# Patient Record
Sex: FEMALE | Race: WHITE | NOT HISPANIC OR LATINO | Employment: OTHER | URBAN - METROPOLITAN AREA
[De-identification: names, ages, dates, MRNs, and addresses within clinical notes are randomized per-mention and may not be internally consistent; named-entity substitution may affect disease eponyms.]

---

## 2023-01-30 PROBLEM — I47.19 PAT (PAROXYSMAL ATRIAL TACHYCARDIA) (CMS-HCC): Status: ACTIVE | Noted: 2023-01-30

## 2023-01-30 PROBLEM — I87.2 CHRONIC VENOUS STASIS DERMATITIS: Status: ACTIVE | Noted: 2023-01-30

## 2023-01-30 PROBLEM — H53.8 BLURRING OF VISUAL IMAGE: Status: ACTIVE | Noted: 2023-01-30

## 2023-01-30 PROBLEM — M81.0 OSTEOPOROSIS: Status: ACTIVE | Noted: 2023-01-30

## 2023-01-30 PROBLEM — R22.31: Status: ACTIVE | Noted: 2023-01-30

## 2023-01-30 PROBLEM — E87.8: Status: ACTIVE | Noted: 2023-01-30

## 2023-01-30 PROBLEM — K21.9 ESOPHAGEAL REFLUX: Status: ACTIVE | Noted: 2023-01-30

## 2023-01-30 PROBLEM — M65.949 SYNOVITIS OF HAND: Status: ACTIVE | Noted: 2023-01-30

## 2023-01-30 PROBLEM — R79.89 ABNORMAL TSH: Status: ACTIVE | Noted: 2023-01-30

## 2023-01-30 PROBLEM — I10 REACTIVE HYPERTENSION: Status: ACTIVE | Noted: 2023-01-30

## 2023-01-30 PROBLEM — R60.9 SOFT TISSUE SWELLING OF BACK: Status: ACTIVE | Noted: 2023-01-30

## 2023-01-30 PROBLEM — H35.81 RETINAL EDEMA: Status: ACTIVE | Noted: 2023-01-30

## 2023-01-30 PROBLEM — R93.89 ABNORMAL CT SCAN, CHEST: Status: ACTIVE | Noted: 2023-01-30

## 2023-01-30 PROBLEM — R39.15 URINARY URGENCY: Status: ACTIVE | Noted: 2023-01-30

## 2023-01-30 PROBLEM — H26.9 CATARACT: Status: ACTIVE | Noted: 2023-01-30

## 2023-01-30 PROBLEM — U09.9 POST-COVID-19 CONDITION: Status: ACTIVE | Noted: 2023-01-30

## 2023-01-30 PROBLEM — H91.90 HEARING LOSS: Status: ACTIVE | Noted: 2023-01-30

## 2023-01-30 PROBLEM — R35.1 NOCTURIA: Status: ACTIVE | Noted: 2023-01-30

## 2023-01-30 PROBLEM — R22.42 LOWER LEG MASS, LEFT: Status: ACTIVE | Noted: 2023-01-30

## 2023-01-30 PROBLEM — E55.9 VITAMIN D DEFICIENCY: Status: ACTIVE | Noted: 2023-01-30

## 2023-01-30 PROBLEM — R30.0 DYSURIA: Status: ACTIVE | Noted: 2023-01-30

## 2023-01-30 PROBLEM — M25.532 CHRONIC PAIN OF LEFT WRIST: Status: ACTIVE | Noted: 2023-01-30

## 2023-01-30 PROBLEM — B37.0 ORAL CANDIDIASIS: Status: ACTIVE | Noted: 2023-01-30

## 2023-01-30 PROBLEM — E87.5 SERUM POTASSIUM ELEVATED: Status: ACTIVE | Noted: 2023-01-30

## 2023-01-30 PROBLEM — R00.2 PALPITATIONS: Status: ACTIVE | Noted: 2023-01-30

## 2023-01-30 PROBLEM — N81.10 PELVIC ORGAN PROLAPSE QUANTIFICATION STAGE 2 CYSTOCELE: Status: ACTIVE | Noted: 2023-01-30

## 2023-01-30 PROBLEM — N81.4 UTERINE PROLAPSE: Status: ACTIVE | Noted: 2023-01-30

## 2023-01-30 PROBLEM — L30.9 ECZEMA: Status: ACTIVE | Noted: 2023-01-30

## 2023-01-30 PROBLEM — R68.2 DRY MOUTH: Status: ACTIVE | Noted: 2023-01-30

## 2023-01-30 PROBLEM — L84 CALLUS OF FOOT: Status: ACTIVE | Noted: 2023-01-30

## 2023-01-30 PROBLEM — M79.9 LESION OF SOFT TISSUE: Status: ACTIVE | Noted: 2023-01-30

## 2023-01-30 PROBLEM — H43.813 POSTERIOR VITREOUS DETACHMENT, BOTH EYES: Status: ACTIVE | Noted: 2023-01-30

## 2023-01-30 PROBLEM — H04.129 DRY EYE SYNDROME: Status: ACTIVE | Noted: 2023-01-30

## 2023-01-30 PROBLEM — D12.6 SERRATED ADENOMA OF COLON: Status: ACTIVE | Noted: 2023-01-30

## 2023-01-30 PROBLEM — G89.29 CHRONIC PAIN OF LEFT WRIST: Status: ACTIVE | Noted: 2023-01-30

## 2023-01-30 PROBLEM — U07.1 COVID-19 VIRUS INFECTION: Status: ACTIVE | Noted: 2023-01-30

## 2023-01-30 PROBLEM — N84.1 CERVICAL POLYP: Status: ACTIVE | Noted: 2023-01-30

## 2023-01-30 PROBLEM — H33.20 RETINAL DETACHMENT: Status: ACTIVE | Noted: 2023-01-30

## 2023-01-30 PROBLEM — E03.9 HYPOTHYROIDISM: Status: ACTIVE | Noted: 2023-01-30

## 2023-01-30 PROBLEM — S30.814A ABRASION OF VAGINA: Status: ACTIVE | Noted: 2023-01-30

## 2023-01-30 PROBLEM — J84.10 GRANULOMATOUS LUNG DISEASE (MULTI): Status: ACTIVE | Noted: 2023-01-30

## 2023-01-30 PROBLEM — M65.9 SYNOVITIS OF HAND: Status: ACTIVE | Noted: 2023-01-30

## 2023-01-30 PROBLEM — R09.89 DECREASED PEDAL PULSES: Status: ACTIVE | Noted: 2023-01-30

## 2023-01-30 PROBLEM — N36.2 URETHRAL POLYP: Status: ACTIVE | Noted: 2023-01-30

## 2023-01-30 PROBLEM — R82.90 ABNORMAL URINALYSIS: Status: ACTIVE | Noted: 2023-01-30

## 2023-01-30 PROBLEM — E28.39 HYPOESTROGENISM: Status: ACTIVE | Noted: 2023-01-30

## 2023-01-30 PROBLEM — M54.50 LUMBAR PAIN: Status: ACTIVE | Noted: 2023-01-30

## 2023-01-30 PROBLEM — R91.1 INCIDENTAL LUNG NODULE, GREATER THAN OR EQUAL TO 8MM: Status: ACTIVE | Noted: 2023-01-30

## 2023-01-30 PROBLEM — M85.80 OSTEOPENIA: Status: ACTIVE | Noted: 2023-01-30

## 2023-01-30 PROBLEM — R35.0 INCREASED FREQUENCY OF URINATION: Status: ACTIVE | Noted: 2023-01-30

## 2023-01-30 RX ORDER — ACETAMINOPHEN 500 MG
1 TABLET ORAL DAILY
COMMUNITY

## 2023-01-30 RX ORDER — MULTIVIT-MIN/FA/LYCOPEN/LUTEIN .4-300-25
1 TABLET ORAL DAILY
COMMUNITY

## 2023-01-30 RX ORDER — GLYCERIN, HYPROMELLOSE, POLYETHYLENE GLYCOL 400 2; 2; 10 MG/ML; MG/ML; MG/ML
SOLUTION/ DROPS OPHTHALMIC AS NEEDED
COMMUNITY

## 2023-01-30 RX ORDER — DENOSUMAB 60 MG/ML
60 INJECTION SUBCUTANEOUS
COMMUNITY
Start: 2019-01-09 | End: 2023-07-14 | Stop reason: SDUPTHER

## 2023-01-30 RX ORDER — PANTOPRAZOLE SODIUM 20 MG/1
1 TABLET, DELAYED RELEASE ORAL DAILY
COMMUNITY
Start: 2022-03-23 | End: 2023-06-13 | Stop reason: SDUPTHER

## 2023-01-30 RX ORDER — LEVOTHYROXINE SODIUM 88 UG/1
88 TABLET ORAL
COMMUNITY
Start: 2022-09-21 | End: 2023-04-12 | Stop reason: SDUPTHER

## 2023-01-30 RX ORDER — CYCLOSPORINE 0.5 MG/ML
1 EMULSION OPHTHALMIC DAILY
COMMUNITY
Start: 2021-06-22

## 2023-01-30 RX ORDER — ESTRADIOL 0.1 MG/G
CREAM VAGINAL 2 TIMES WEEKLY
COMMUNITY
Start: 2019-02-18

## 2023-01-30 RX ORDER — METOPROLOL SUCCINATE 50 MG/1
1.5 TABLET, EXTENDED RELEASE ORAL DAILY
COMMUNITY
Start: 2012-10-08 | End: 2023-08-14 | Stop reason: SDUPTHER

## 2023-03-14 ENCOUNTER — OFFICE VISIT (OUTPATIENT)
Dept: PRIMARY CARE | Facility: CLINIC | Age: 78
End: 2023-03-14
Payer: MEDICARE

## 2023-03-14 VITALS
RESPIRATION RATE: 18 BRPM | WEIGHT: 129 LBS | HEART RATE: 70 BPM | HEIGHT: 65 IN | SYSTOLIC BLOOD PRESSURE: 160 MMHG | BODY MASS INDEX: 21.49 KG/M2 | DIASTOLIC BLOOD PRESSURE: 78 MMHG

## 2023-03-14 DIAGNOSIS — L84 CALLUS OF FOOT: ICD-10-CM

## 2023-03-14 DIAGNOSIS — M81.0 AGE-RELATED OSTEOPOROSIS WITHOUT CURRENT PATHOLOGICAL FRACTURE: ICD-10-CM

## 2023-03-14 DIAGNOSIS — Z00.01 ANNUAL VISIT FOR GENERAL ADULT MEDICAL EXAMINATION WITH ABNORMAL FINDINGS: ICD-10-CM

## 2023-03-14 DIAGNOSIS — Z78.9 FULL CODE STATUS: ICD-10-CM

## 2023-03-14 DIAGNOSIS — R73.09 ELEVATED GLUCOSE LEVEL: ICD-10-CM

## 2023-03-14 DIAGNOSIS — N36.2 URETHRAL POLYP: ICD-10-CM

## 2023-03-14 DIAGNOSIS — I10 REACTIVE HYPERTENSION: ICD-10-CM

## 2023-03-14 DIAGNOSIS — E03.9 HYPOTHYROIDISM, UNSPECIFIED TYPE: ICD-10-CM

## 2023-03-14 DIAGNOSIS — Z00.00 ENCOUNTER FOR SUBSEQUENT ANNUAL WELLNESS VISIT (AWV) IN MEDICARE PATIENT: Primary | ICD-10-CM

## 2023-03-14 DIAGNOSIS — R03.0 ELEVATED BLOOD PRESSURE READING: ICD-10-CM

## 2023-03-14 DIAGNOSIS — Z13.89 ENCOUNTER FOR SCREENING FOR OTHER DISORDER: ICD-10-CM

## 2023-03-14 DIAGNOSIS — R35.1 NOCTURIA: ICD-10-CM

## 2023-03-14 DIAGNOSIS — R12 HEARTBURN: ICD-10-CM

## 2023-03-14 PROBLEM — R30.0 DYSURIA: Status: RESOLVED | Noted: 2023-01-30 | Resolved: 2023-03-14

## 2023-03-14 PROBLEM — I83.893 VARICOSE VEINS OF BOTH LEGS WITH EDEMA: Status: ACTIVE | Noted: 2023-03-14

## 2023-03-14 LAB
POC APPEARANCE, URINE: CLEAR
POC BILIRUBIN, URINE: NEGATIVE
POC BLOOD, URINE: NEGATIVE
POC COLOR, URINE: YELLOW
POC GLUCOSE, URINE: NEGATIVE MG/DL
POC HEMOGLOBIN A1C: 5.8 % (ref 4.2–6.5)
POC KETONES, URINE: NEGATIVE MG/DL
POC LEUKOCYTES, URINE: NEGATIVE
POC NITRITE,URINE: NEGATIVE
POC PH, URINE: 6.5 PH
POC PROTEIN, URINE: NEGATIVE MG/DL
POC SPECIFIC GRAVITY, URINE: <=1.005
POC UROBILINOGEN, URINE: 0.2 EU/DL

## 2023-03-14 PROCEDURE — 36416 COLLJ CAPILLARY BLOOD SPEC: CPT | Performed by: INTERNAL MEDICINE

## 2023-03-14 PROCEDURE — G0442 ANNUAL ALCOHOL SCREEN 15 MIN: HCPCS | Performed by: INTERNAL MEDICINE

## 2023-03-14 PROCEDURE — 1160F RVW MEDS BY RX/DR IN RCRD: CPT | Performed by: INTERNAL MEDICINE

## 2023-03-14 PROCEDURE — 1159F MED LIST DOCD IN RCRD: CPT | Performed by: INTERNAL MEDICINE

## 2023-03-14 PROCEDURE — 81003 URINALYSIS AUTO W/O SCOPE: CPT | Performed by: INTERNAL MEDICINE

## 2023-03-14 PROCEDURE — 83036 HEMOGLOBIN GLYCOSYLATED A1C: CPT | Performed by: INTERNAL MEDICINE

## 2023-03-14 PROCEDURE — 99397 PER PM REEVAL EST PAT 65+ YR: CPT | Performed by: INTERNAL MEDICINE

## 2023-03-14 PROCEDURE — G0439 PPPS, SUBSEQ VISIT: HCPCS | Performed by: INTERNAL MEDICINE

## 2023-03-14 PROCEDURE — 1123F ACP DISCUSS/DSCN MKR DOCD: CPT | Performed by: INTERNAL MEDICINE

## 2023-03-14 PROCEDURE — 3078F DIAST BP <80 MM HG: CPT | Performed by: INTERNAL MEDICINE

## 2023-03-14 PROCEDURE — 1036F TOBACCO NON-USER: CPT | Performed by: INTERNAL MEDICINE

## 2023-03-14 PROCEDURE — 1170F FXNL STATUS ASSESSED: CPT | Performed by: INTERNAL MEDICINE

## 2023-03-14 PROCEDURE — 3077F SYST BP >= 140 MM HG: CPT | Performed by: INTERNAL MEDICINE

## 2023-03-14 SDOH — ECONOMIC STABILITY: FOOD INSECURITY: WITHIN THE PAST 12 MONTHS, THE FOOD YOU BOUGHT JUST DIDN'T LAST AND YOU DIDN'T HAVE MONEY TO GET MORE.: NEVER TRUE

## 2023-03-14 SDOH — ECONOMIC STABILITY: INCOME INSECURITY: IN THE LAST 12 MONTHS, WAS THERE A TIME WHEN YOU WERE NOT ABLE TO PAY THE MORTGAGE OR RENT ON TIME?: NO

## 2023-03-14 SDOH — ECONOMIC STABILITY: HOUSING INSECURITY: IN THE LAST 12 MONTHS, HOW MANY PLACES HAVE YOU LIVED?: 1

## 2023-03-14 SDOH — ECONOMIC STABILITY: TRANSPORTATION INSECURITY
IN THE PAST 12 MONTHS, HAS THE LACK OF TRANSPORTATION KEPT YOU FROM MEDICAL APPOINTMENTS OR FROM GETTING MEDICATIONS?: NO

## 2023-03-14 SDOH — ECONOMIC STABILITY: FOOD INSECURITY: WITHIN THE PAST 12 MONTHS, YOU WORRIED THAT YOUR FOOD WOULD RUN OUT BEFORE YOU GOT MONEY TO BUY MORE.: NEVER TRUE

## 2023-03-14 SDOH — ECONOMIC STABILITY: HOUSING INSECURITY
IN THE LAST 12 MONTHS, WAS THERE A TIME WHEN YOU DID NOT HAVE A STEADY PLACE TO SLEEP OR SLEPT IN A SHELTER (INCLUDING NOW)?: NO

## 2023-03-14 SDOH — HEALTH STABILITY: PHYSICAL HEALTH: ON AVERAGE, HOW MANY MINUTES DO YOU ENGAGE IN EXERCISE AT THIS LEVEL?: 10 MIN

## 2023-03-14 SDOH — HEALTH STABILITY: PHYSICAL HEALTH: ON AVERAGE, HOW MANY DAYS PER WEEK DO YOU ENGAGE IN MODERATE TO STRENUOUS EXERCISE (LIKE A BRISK WALK)?: 3 DAYS

## 2023-03-14 SDOH — ECONOMIC STABILITY: TRANSPORTATION INSECURITY
IN THE PAST 12 MONTHS, HAS LACK OF TRANSPORTATION KEPT YOU FROM MEETINGS, WORK, OR FROM GETTING THINGS NEEDED FOR DAILY LIVING?: NO

## 2023-03-14 ASSESSMENT — PATIENT HEALTH QUESTIONNAIRE - PHQ9
SUM OF ALL RESPONSES TO PHQ9 QUESTIONS 1 AND 2: 0
2. FEELING DOWN, DEPRESSED OR HOPELESS: NOT AT ALL
1. LITTLE INTEREST OR PLEASURE IN DOING THINGS: NOT AT ALL
2. FEELING DOWN, DEPRESSED OR HOPELESS: NOT AT ALL
1. LITTLE INTEREST OR PLEASURE IN DOING THINGS: NOT AT ALL
SUM OF ALL RESPONSES TO PHQ9 QUESTIONS 1 & 2: 0

## 2023-03-14 ASSESSMENT — ACTIVITIES OF DAILY LIVING (ADL)
HEARING - RIGHT EAR: HEARING AID
JUDGMENT_ADEQUATE_SAFELY_COMPLETE_DAILY_ACTIVITIES: YES
EATING: INDEPENDENT
GROOMING: INDEPENDENT
BATHING: INDEPENDENT
DRESSING YOURSELF: INDEPENDENT
TOILETING: INDEPENDENT
ADEQUATE_TO_COMPLETE_ADL: YES
PILL BOX USED: YES
USING TELEPHONE: INDEPENDENT
STIL DRIVING: NO
PATIENT'S MEMORY ADEQUATE TO SAFELY COMPLETE DAILY ACTIVITIES?: YES
PREPARING MEALS: INDEPENDENT
USING TRANSPORTATION: NEEDS ASSISTANCE
WALKS IN HOME: INDEPENDENT
DOING HOUSEWORK: INDEPENDENT
HEARING - LEFT EAR: HEARING AID
MANAGING FINANCES: NEEDS ASSISTANCE
GROCERY SHOPPING: INDEPENDENT
NEEDS ASSISTANCE WITH FOOD: INDEPENDENT
FEEDING YOURSELF: INDEPENDENT
TAKING MEDICATION: INDEPENDENT

## 2023-03-14 ASSESSMENT — SOCIAL DETERMINANTS OF HEALTH (SDOH)
WITHIN THE LAST YEAR, HAVE TO BEEN RAPED OR FORCED TO HAVE ANY KIND OF SEXUAL ACTIVITY BY YOUR PARTNER OR EX-PARTNER?: NO
IN A TYPICAL WEEK, HOW MANY TIMES DO YOU TALK ON THE PHONE WITH FAMILY, FRIENDS, OR NEIGHBORS?: MORE THAN THREE TIMES A WEEK
HOW OFTEN DO YOU GET TOGETHER WITH FRIENDS OR RELATIVES?: TWICE A WEEK
DO YOU BELONG TO ANY CLUBS OR ORGANIZATIONS SUCH AS CHURCH GROUPS UNIONS, FRATERNAL OR ATHLETIC GROUPS, OR SCHOOL GROUPS?: NO
HOW OFTEN DO YOU ATTEND CHURCH OR RELIGIOUS SERVICES?: MORE THAN 4 TIMES PER YEAR
HOW HARD IS IT FOR YOU TO PAY FOR THE VERY BASICS LIKE FOOD, HOUSING, MEDICAL CARE, AND HEATING?: NOT HARD AT ALL
HOW OFTEN DO YOU ATTENT MEETINGS OF THE CLUB OR ORGANIZATION YOU BELONG TO?: NEVER
WITHIN THE LAST YEAR, HAVE YOU BEEN KICKED, HIT, SLAPPED, OR OTHERWISE PHYSICALLY HURT BY YOUR PARTNER OR EX-PARTNER?: NO
WITHIN THE LAST YEAR, HAVE YOU BEEN HUMILIATED OR EMOTIONALLY ABUSED IN OTHER WAYS BY YOUR PARTNER OR EX-PARTNER?: NO
WITHIN THE LAST YEAR, HAVE YOU BEEN AFRAID OF YOUR PARTNER OR EX-PARTNER?: NO

## 2023-03-14 ASSESSMENT — ENCOUNTER SYMPTOMS
LOSS OF SENSATION IN FEET: 0
OCCASIONAL FEELINGS OF UNSTEADINESS: 0
DEPRESSION: 0

## 2023-03-14 ASSESSMENT — COLUMBIA-SUICIDE SEVERITY RATING SCALE - C-SSRS
1. IN THE PAST MONTH, HAVE YOU WISHED YOU WERE DEAD OR WISHED YOU COULD GO TO SLEEP AND NOT WAKE UP?: NO
2. HAVE YOU ACTUALLY HAD ANY THOUGHTS OF KILLING YOURSELF?: NO
6. HAVE YOU EVER DONE ANYTHING, STARTED TO DO ANYTHING, OR PREPARED TO DO ANYTHING TO END YOUR LIFE?: NO

## 2023-03-14 ASSESSMENT — LIFESTYLE VARIABLES
AUDIT-C TOTAL SCORE: 1
HOW OFTEN DO YOU HAVE SIX OR MORE DRINKS ON ONE OCCASION: NEVER
SKIP TO QUESTIONS 9-10: 1
HOW OFTEN DO YOU HAVE A DRINK CONTAINING ALCOHOL: MONTHLY OR LESS
HOW MANY STANDARD DRINKS CONTAINING ALCOHOL DO YOU HAVE ON A TYPICAL DAY: 1 OR 2

## 2023-03-14 ASSESSMENT — PAIN SCALES - GENERAL: PAINLEVEL: 0-NO PAIN

## 2023-03-14 NOTE — PATIENT INSTRUCTIONS
Mammogram due in August.  Blood test for prolia around that time. And fasting as will check sugar and cholesterol.  Next Prolia schedule for august.  Follow up here one year for wellness visit.  Bone density due later in the fall.    Reception: please notify Geetha she will need a PA for Prolia in August.    Staying hydrated is important for many bodily functions. Generally consuming 48-64 ounces of water per day is recommended.    Keep appointment  with  Dr Marin about heartburn despite taking 20 mg protonix.(You take the generic). With concern for higher dose putting you at risk for c diff.  Ask about adding pepcid/famotidine?  Defer to Dr Marin on this.    Thyroid dose was ok on January labs, repeat next January at the latest.    Follow up for blood pressure in3 months.  Check bp once weekly at home, bring readings and bp cuff to the next appt.

## 2023-03-14 NOTE — PROGRESS NOTES
Subjective   Patient ID: Mary Ibrahim is a 78 y.o. female who presents for Medicare Annual Wellness Visit Subsequent (Pt here for subsequent annual Medicare Wellness visit. Pt denies any new problems or concerns. Pt sees GYN for Pelvic-has prolapse. ).      Past Medical, Surgical, and Family History reviewed and updated in chart.  MAMM 8/11/22, CT CARDIO 5/25/22, DEXA 10/19/21, CRC SCRN 8/2021       HPI  Here for mcw visit and annual pe (non gyn), and follow up on results and meds.  Reviewed medicare wellness issue.  Full code  Hcpoa ángel    Neg phq2 and alcohol screen.   Vaccines utd       Scribe Transcription:  She states she has never smoked regularly. She reports exercising.    She denies changes in stool, constipation, and diarrhea. She denies stool incontinence. She reports some urinary incontinence which is seen by urogynecology. She last saw them in October 2022.     She reports feeling generally well.  She has an issue with callus on her feet.  Has varicose veins, they are not bothering her anymore than usual  She reports some neuropathy like sensation on the lateral half of her right foot near her toes.     Abnl ct chest, were following it and then she had covid which caused other issues in the lung on the ct. She feels fine.She had bronchial wall thickening, multiple bilateral pulmonary lesions thought to be related to covid. I looked at the CT from November. She is getting another CT soon to f/u on that from pulmonary in [place].           Patient Care Team:  Mouna Esparza MD as PCP - General  Mouna Esparza MD as PCP - [employer] PCP  Mouna Esparza MD as PCP - [employer]age PCP     Review of Systems     Review of systems, written document, scanned in to office visit.  I reviewed 7 page history and review of systems form.  Objective   Vitals:  /78 (BP Location: Left arm, Patient Position: Sitting, BP Cuff Size: Adult)   Pulse 70   Resp 18   Ht  "1.651 m (5' 5\")   Wt 58.5 kg (129 lb)   BMI 21.47 kg/m²     Darlyn bp by charlee davis:  /58 (BP Location: Right arm, Patient Position: Sitting, BP Cuff Size: Adult)   Pulse 84   Resp 18   Ht 1.651 m (5' 5\")   Wt 58.5 kg (129 lb)   BMI 21.47 kg/m²          Physical Exam  General: Patient is known to me, looks well, is in usual state of health, with  no obvious distress.  Head: normocephalic  Eyes: PERRL, EOMI, no scleral icterus or conjunctival abnormality  Ears:Normal canals and TM's  Oropharynx: Mask in place  Neck: No masses, no cervical (A/P/ or Lateral) adenopathy. Thyroid not enlarged and no nodules. Carotid pulses normal and no bruits.  Chest: Normal AP diameter. No supraclavicular adenopathy.  Lungs: Clear to auscultation: no rales , wheezes, rhonchi, rubs, examined bilaterally, ant/post /lateral. RR normal.  Heart: RRR. No MGCR S1 S2 normal.  Abd: BS normal, no bruits. No hepatosplenomegaly. No abdominal mass or tenderness. No distension.  Extremities: No upper extremity edema. No lower leg edema.No ischemia.   Has extensive varicosities bilateral lower legs and feet not new.  Has some chronic deformities of toes.feet. callus right plantar 3rd,refer.  Joints: no synovitis seen. No deformities.  Pulses: normal posterior tibialis pulses, normal dorsalis pedis pulses. normal radial pulses. Normal femoral pulses without bruits.  Neuro: CN 2-12 intact . Alert, oriented, appropriate.  No focal weakness observed. No tremors. Ambulation is normal .  Psych: Mood and affect normal. No cognitive deficits noted during this exam. Alert, oriented, appropriate.  Skin: No rash, petechiae or excessive bruising.  Lymph: no SC axillary or inguinal adenopathy   Breast Exam : Symmetric appearance. No masses, nipple discharge, skin retraction, axillary or supraclavicular adenopathy.        Assessment/Plan   Problem List Items Addressed This Visit          Circulatory    Reactive hypertension--bp was not a lot better on darlyn. " Will have her monitor at home as this is unusual bp for her, she will call if stays up. Follow up 3 months to review.       Genitourinary    Urethral polyp    Relevant Orders    POCT UA Automated manually resulted (Completed)  Continue to follow with urogyn, asymp       Musculoskeletal    Callus of foot-refer podiatry. Will contact her with name of podiatrist that is at [facility] which is closer for her, is not in the provider caty.    Osteoporosis-continue meds, exercise, follow dexa       Endocrine/Metabolic    Hypothyroidism-jan labs euthyroid, same dose, darlyn jan 2024       Other    Nocturia    Relevant Orders    POCT UA Automated manually resulted (Completed)     Other Visit Diagnoses    Heartburn, breakthru despite daily use of low dose ppi, follow up gi  Abnl CT chest, asymp, is likely from post covid, non smoker, has pulm med follow up.   Routine general medical examination at health care facility    -  Primary    Encounter for subsequent annual wellness visit (AWV) in Medicare patient        Elevated blood pressure reading        Annual visit for general adult medical examination with abnormal findings        Elevated glucose level        Relevant Orders    POCT glycosylated hemoglobin (Hb A1C) manually resulted (Completed)

## 2023-03-16 ENCOUNTER — OFFICE (OUTPATIENT)
Dept: URBAN - METROPOLITAN AREA CLINIC 26 | Facility: CLINIC | Age: 78
End: 2023-03-16

## 2023-03-16 VITALS
WEIGHT: 129 LBS | SYSTOLIC BLOOD PRESSURE: 159 MMHG | DIASTOLIC BLOOD PRESSURE: 72 MMHG | HEIGHT: 65 IN | HEART RATE: 70 BPM | TEMPERATURE: 97.7 F

## 2023-03-16 DIAGNOSIS — K21.9 GASTRO-ESOPHAGEAL REFLUX DISEASE WITHOUT ESOPHAGITIS: ICD-10-CM

## 2023-03-16 PROCEDURE — 99213 OFFICE O/P EST LOW 20 MIN: CPT | Performed by: INTERNAL MEDICINE

## 2023-03-16 RX ORDER — ESOMEPRAZOLE MAGNESIUM 20 MG/1
CAPSULE, DELAYED RELEASE ORAL
Qty: 90 | Refills: 1 | Status: ACTIVE

## 2023-03-16 RX ORDER — PANTOPRAZOLE 20 MG/1
TABLET, DELAYED RELEASE ORAL
Status: COMPLETED
End: 2023-03-16

## 2023-03-22 ENCOUNTER — TELEPHONE (OUTPATIENT)
Dept: PRIMARY CARE | Facility: CLINIC | Age: 78
End: 2023-03-22
Payer: MEDICARE

## 2023-03-22 DIAGNOSIS — L84 CALLUS OF FOOT: Primary | ICD-10-CM

## 2023-04-12 DIAGNOSIS — E03.9 HYPOTHYROIDISM, UNSPECIFIED TYPE: ICD-10-CM

## 2023-04-12 RX ORDER — LEVOTHYROXINE SODIUM 88 UG/1
88 TABLET ORAL DAILY
Qty: 90 TABLET | Refills: 1 | Status: SHIPPED | OUTPATIENT
Start: 2023-04-12 | End: 2023-08-14 | Stop reason: SDUPTHER

## 2023-06-09 ASSESSMENT — ENCOUNTER SYMPTOMS
HYPERTENSION: 1
ORTHOPNEA: 0
SHORTNESS OF BREATH: 0
PALPITATIONS: 1
SWEATS: 0
BLURRED VISION: 1
HEADACHES: 0
NECK PAIN: 0
PND: 0

## 2023-06-10 NOTE — PROGRESS NOTES
Subjective   Patient ID: Mary Ibrahim is a 78 y.o. female who presents for Follow-up (Pt here for follow up visit. PT denies any new issues or concerns at this time. ).follow up on blood pressure.  LAST VISIT PLAN 3/14/2023  PATIENT'S DISCUSSION/SUMMARY:  Mammogram due in August.  Blood test for prolia around that time. And fasting as will check sugar and cholesterol.  Next Prolia schedule for august.  Follow up here one year for wellness visit.  Bone density due later in the fall.  Reception: please notify Geetha she will need a PA for Prolia in August.  Staying hydrated is important for many bodily functions. Generally consuming 48-64 ounces of water per day is recommended.  Keep appointment  with  Dr Marin about heartburn despite taking 20 mg protonix.(You take the generic). With concern for higher dose putting you at risk for c diff.  Ask about adding pepcid/famotidine?  Defer to Dr Marin on this.  Thyroid dose was ok on January labs, repeat next January at the latest.  Follow up for blood pressure in3 months.  Check bp once weekly at home, bring readings and bp cuff to the next appt.  PROVIDER'S IMPRESSIONS:    Circulatory      Reactive hypertension--bp was not a lot better on darlyn. Will have her monitor at home as this is unusual bp for her, she will call if stays up. Follow up 3 months to review.          Genitourinary     Urethral polyp     Relevant Orders     POCT UA Automated manually resulted (Completed)  Continue to follow with urogyn, asymp          Musculoskeletal     Callus of foot-refer podiatry. Will contact her with name of podiatrist that is at [facility] which is closer for her, is not in the provider caty.     Osteoporosis-continue meds, exercise, follow dexa          Endocrine/Metabolic     Hypothyroidism-jan labs euthyroid, same dose, darlyn jan 2024          Other     Nocturia     Relevant Orders     POCT UA Automated manually resulted (Completed)      Other Visit Diagnoses     Heartburn, breakthru despite daily use of low dose ppi, follow up gi  Abnl CT chest, asymp, is likely from post covid, non smoker, has pulm med follow up.    Routine general medical examination at health care facility    -  Primary     Encounter for subsequent annual wellness visit (AWV) in Medicare patient         Elevated blood pressure reading         Annual visit for general adult medical examination with abnormal findings         Elevated glucose level         Relevant Orders     POCT glycosylated hemoglobin (Hb A1C) manually resulted (Completed)             END INFO FROM PRIOR VISIT    HPI  Here for follow up on hypertension, , brought in a list from home.  Htn darlyn home bp's are good    Has no new complaints but notes she is Worried about Esophagus and reflux-she is seeing gi but has questions about care there.  Failed protonix and changed to esomeprazole 20mg at night, just before bed and she has not eaten for 3-4 hours.  She takes antacids right after eating supper.  Her symptoms were mostly at night  Saw gi dr guilherme roy and this was his plan re esomeprazole    Was better until the past 3 nights.  Gets up to help son(handicapped), she sleeps in his room, and when goes back to bed gets heartburn.    She does not think it is related to what she ate for supper. She does not drink coffee.  If eats chocolate it is early in the morning.    Still has her gallbladder.  If it happens at night she drinks room temp water.then in an hour she can go back to bed and it is ok.  Taking tums 750mg after supper.---? Calcium rebound??      No abd pain nausea or bloating aftger eating.  Frustrated with dr vanegas's availability, he moved her follo wup out.    Scribe Transcription:  She continues to take 1.5 tabs of her  metoprolol ER for total of 75 mg daily    She states her GI specialist discussed getting her off her PPI and removing her gallbladder suspecting that that is the reason for her heartburn. She states he  was not seeing patients from December to March and didn’t refer her to someone else. She states the issue with her esophagus concerned her so she has been trying to eat the right things. He thought pantoprazole was not working well for her so he changed her to Nexium. She takes her esomeprazole at 10:30 just before bed and she eats dinner 2-4 hours prior to that. She takes her antacids right after she finishes supper, which are tums tabs. She was doing well the past 3 nights and it hasn’t been waking her up, but she does get up to use the bathroom and shortly after that her heartburn returns. She denies getting reflux but just burning symptoms and this  re-started 3 nights ago. She is not sure if changes in her suppers started this. She denies drinking coffee throughout the day. If she eats chocolate it is early in the morning. She tries to drink room temperature water to soothe her esophagus. It usually takes about an hour for her to go back to sleep. She is using Tums 750 mg x 2 for her antacid. She denies dysphagia. She feels she is able to digest her food from supper.     Scribe Attestation  By signing my name below, I, Elias Bey, [place]rib[place]ín   attest that this documentation has been prepared under the direction and in the presence of Mouna Esparza MD.     No change in bowels. No melena or blood in stool  No nn/v or abd pain, just the upper abd and esoph burning.    No cp palpitations sob soboe or increased leg swelling. She wears compression socks due to severe varicose veins.  Review of Systems    Objective   Lab Results   Component Value Date    WBC 6.8 01/24/2023    HGB 13.1 01/24/2023    HCT 42.3 01/24/2023     01/24/2023    CHOL 139 04/21/2022    TRIG 59 04/21/2022    HDL 52.3 04/21/2022    ALT 20 01/24/2023    AST 25 01/24/2023     (H) 01/24/2023    K 3.8 01/24/2023     01/24/2023    CREATININE 0.75 01/24/2023    BUN 20 01/24/2023    CO2 33 (H) 01/24/2023    TSH 1.52 01/24/2023     "HGBA1C 5.8 03/14/2023     Physical ExamBP 124/56 Comment: this was at home today. I ckd bp manually and 162/70, then she checked with her home auto cuff and 160/60 so cuff is accurate, bp is ok.  Pulse 64   Resp 18   Ht 1.651 m (5' 5\")   Wt 59 kg (130 lb)   BMI 21.63 kg/m²       9/21/2022     2:28 PM 11/28/2022     2:13 PM 3/14/2023    10:19 AM 3/14/2023    12:01 PM 3/28/2023     1:46 PM 6/13/2023    11:33 AM 6/13/2023    12:41 PM   Vitals   Systolic 134 132 154 160 128 144 124   Diastolic 58 62 58 78 70 64 56   Heart Rate 72 78 84 70 88 64    Temp  36.2 °C (97.1 °F)   36.5 °C (97.7 °F)     Resp 18  18   18    Height (in)  1.651 m (5' 5\") 1.651 m (5' 5\")  1.651 m (5' 5\") 1.651 m (5' 5\")    Weight (lb) 131 129.2 129  128.4 130    BMI 21.8 kg/m2 21.5 kg/m2 21.47 kg/m2  21.37 kg/m2 21.63 kg/m2    BSA (m2) 1.65 m2 1.64 m2 1.64 m2  1.63 m2 1.64 m2    Visit Report   Report Report  Report Report     160/62 on her cuff left arm and my manual check was 162/60 left arm, so her cuff is accurate.  Her home bp's are good.  Patient looks well and is in no obvious distress.  HEENT:   Normocephalic, no facial asymmetry  Eyes: Sclera and conjunctiva are clear.  Neck: No adenopathy cervical (Ant/post/lat), no Supraclavicular nodes.   Thyroid normal.   Carotid pulses normal, no carotid bruits.  Lungs : RR normal. Clear to auscultation anterior, posterior and lateral. No rales, wheezes rhonchi or rubs. Good air exchange.  Heart: RRR. No Murmur, gallop, click or rub.  Abdomen: Bowel sounds normal, No bruits. No pulsatile mass. No hepatosplenomegaly, masses or tenderness. Soft, no guarding.  Vascular:  Posterior tibialis  pulses within normal limits bilaterally.   Extremities: no upper extremity edema. No lower extremity edema.   Musculoskeletal: no synovitis of joints seen. No new deformity.  Neuro: CN 2-12 intact. Alert, appropriate.   Ambulates independently.  No gross motor deficit.   No tremors.  Psych: normal mood and " "affect.  Skin: No rash, bruising petechiae or jaundice.    Assessment/Plan   Problem List Items Addressed This Visit       Hypothyroidism    Relevant Orders    Lipid Panel    TSH with reflex to Free T4 if abnormal    Osteoporosis    Relevant Orders    Vitamin D, Total    Reactive hypertension - Primary    Relevant Orders    Lipid Panel    Comprehensive Metabolic Panel    Albumin , Urine Random   Her home bp's are good and her cuff is accurate. Not clear why she is getting a high bp in the office now. Is ok at home, no med changes, some labs will be due.  Other Visit Diagnoses       Elevated glucose level        Relevant Orders    Comprehensive Metabolic Panel    Hemoglobin A1C    Heartburn        Relevant Orders    CBC and Auto Differential    US gallbladder  Change antacid to a non calcium containing produc, perhaps she getting calcium rebound.    Current use of proton pump inhibitor        Relevant Orders    Vitamin B12    Dyspepsia        Relevant Orders    US gallbladder        .Pt instructions:  \"Keep same medications.  Your home blood pressures are ok and your blood pressure cuff is accurate.  Change batteries every 90 days.    Follow up jan/February for your annual and recheck on blood pressure.  Call if problems.  Flu shot in the fall.  Keep prolia appt. August here.    For heartburn at night, take mylanta or gaviscon (antacid that is not calcium based) at night if needed.  Continue tums after supper and nexium before bed.  Schedule ultrasound of gallbladder.  If this is not helping, consider increasing the dose of the esomeprazole form 20 mg to 40 mg, and discussing with Dr Marin.\"  "

## 2023-06-13 ENCOUNTER — OFFICE VISIT (OUTPATIENT)
Dept: PRIMARY CARE | Facility: CLINIC | Age: 78
End: 2023-06-13
Payer: MEDICARE

## 2023-06-13 VITALS
DIASTOLIC BLOOD PRESSURE: 56 MMHG | BODY MASS INDEX: 21.66 KG/M2 | SYSTOLIC BLOOD PRESSURE: 124 MMHG | WEIGHT: 130 LBS | HEART RATE: 64 BPM | RESPIRATION RATE: 18 BRPM | HEIGHT: 65 IN

## 2023-06-13 DIAGNOSIS — I10 WHITE COAT SYNDROME WITH DIAGNOSIS OF HYPERTENSION: ICD-10-CM

## 2023-06-13 DIAGNOSIS — R10.13 DYSPEPSIA: ICD-10-CM

## 2023-06-13 DIAGNOSIS — E03.9 HYPOTHYROIDISM, UNSPECIFIED TYPE: ICD-10-CM

## 2023-06-13 DIAGNOSIS — R73.09 ELEVATED GLUCOSE LEVEL: ICD-10-CM

## 2023-06-13 DIAGNOSIS — M81.0 AGE-RELATED OSTEOPOROSIS WITHOUT CURRENT PATHOLOGICAL FRACTURE: ICD-10-CM

## 2023-06-13 DIAGNOSIS — Z79.899 CURRENT USE OF PROTON PUMP INHIBITOR: ICD-10-CM

## 2023-06-13 DIAGNOSIS — I10 REACTIVE HYPERTENSION: Primary | ICD-10-CM

## 2023-06-13 DIAGNOSIS — R12 HEARTBURN: ICD-10-CM

## 2023-06-13 PROCEDURE — 99215 OFFICE O/P EST HI 40 MIN: CPT | Performed by: INTERNAL MEDICINE

## 2023-06-13 PROCEDURE — 3078F DIAST BP <80 MM HG: CPT | Performed by: INTERNAL MEDICINE

## 2023-06-13 PROCEDURE — 1159F MED LIST DOCD IN RCRD: CPT | Performed by: INTERNAL MEDICINE

## 2023-06-13 PROCEDURE — 3074F SYST BP LT 130 MM HG: CPT | Performed by: INTERNAL MEDICINE

## 2023-06-13 PROCEDURE — 1160F RVW MEDS BY RX/DR IN RCRD: CPT | Performed by: INTERNAL MEDICINE

## 2023-06-13 PROCEDURE — 1036F TOBACCO NON-USER: CPT | Performed by: INTERNAL MEDICINE

## 2023-06-13 RX ORDER — HYDROGEN PEROXIDE 3 %
20 SOLUTION, NON-ORAL MISCELLANEOUS
COMMUNITY
Start: 2023-03-16

## 2023-06-13 RX ORDER — UREA 40 %
1 CREAM (GRAM) TOPICAL DAILY
COMMUNITY

## 2023-06-13 ASSESSMENT — PAIN SCALES - GENERAL: PAINLEVEL: 0-NO PAIN

## 2023-06-13 NOTE — PATIENT INSTRUCTIONS
Keep same medications.  Your home blood pressures are ok and your blood pressure cuff is accurate.  Change batteries every 90 days.    Follow up jan/February for your annual and recheck on blood pressure.  Call if problems.  Flu shot in the fall.  Keep prolia appt. August here.    For heartburn at night, take mylanta or gaviscon (antacid that is not calcium based) at night if needed.  Continue tums after supper and nexium before bed.  Schedule ultrasound of gallbladder.  If this is not helping, consider increasing the dose of the esomeprazole form 20 mg to 40 mg, and discussing with Dr Marin.

## 2023-07-12 DIAGNOSIS — K82.4 GALLBLADDER POLYP: Primary | ICD-10-CM

## 2023-07-14 DIAGNOSIS — M81.0 OSTEOPOROSIS WITHOUT CURRENT PATHOLOGICAL FRACTURE, UNSPECIFIED OSTEOPOROSIS TYPE: Primary | ICD-10-CM

## 2023-07-14 NOTE — TELEPHONE ENCOUNTER
Patient has an appointment next month for her Prolia. Her insurance prior authorization  in . This will need renewed. Patient will need a [facility] for [facility] so the prior-auth can be started.

## 2023-07-17 RX ORDER — DENOSUMAB 60 MG/ML
60 INJECTION SUBCUTANEOUS ONCE
Qty: 1 ML | Refills: 0 | Status: SHIPPED | OUTPATIENT
Start: 2023-07-17 | End: 2024-02-27 | Stop reason: WASHOUT

## 2023-07-27 ENCOUNTER — TELEPHONE (OUTPATIENT)
Dept: PRIMARY CARE | Facility: CLINIC | Age: 78
End: 2023-07-27
Payer: MEDICARE

## 2023-07-27 NOTE — TELEPHONE ENCOUNTER
Any insurances that require a PA now for Prolia will be sent to  [facility] and if approved, will be delivered to our office to be administered.  [facility] assists with obtaining prior-auth's. Patient did have a previous approved PA but it  on 2023. I sent an email to our contact rep at the  Pharmacy to get an update on the status of the PA.

## 2023-07-27 NOTE — TELEPHONE ENCOUNTER
I called the pt back.  She asked if her labs are up to date for her Prolia injection as she is scheduled to have her injection on 8/23/23.  Her last CMP was 6/13/23. Her last DEXA was 10/19/21. Please advise if she should have another CMP prior to 8/23/23.    Also, she reports she received an email from Geetha and had questions if she needed to get her Prolia at a [facility] in [facility]?  She reports she has never had to do that in the past. Did something change? She asked if a prior auth had been started? When she spoke to her ins company they did not have a prior auth for this Prolia injection  She would like for you to call her.  Thank you.

## 2023-07-27 NOTE — TELEPHONE ENCOUNTER
I spoke with the  [facility] and they were able to get Prolia approved through [employer]. This prior auth is valid until 7/26/24. I will scan in approval letter. I did speak with patient and let her know that Prolia has been approved, it has been billed through her pharmacy benefits by  [facility] and the Prolia will be delivered to our office. I also let patient know to have lab work drawn the week prior to her injection. Patient states that all the lab work that is already ordered was not to be drawn until January. She will need a separate order put in for a CMP only for the Prolia.

## 2023-07-28 DIAGNOSIS — M81.8 OTHER OSTEOPOROSIS WITHOUT CURRENT PATHOLOGICAL FRACTURE: Primary | ICD-10-CM

## 2023-08-02 ENCOUNTER — PATIENT MESSAGE (OUTPATIENT)
Dept: PRIMARY CARE | Facility: CLINIC | Age: 78
End: 2023-08-02
Payer: MEDICARE

## 2023-08-02 DIAGNOSIS — Z78.0 MENOPAUSE: ICD-10-CM

## 2023-08-02 DIAGNOSIS — Z12.31 VISIT FOR SCREENING MAMMOGRAM: ICD-10-CM

## 2023-08-02 DIAGNOSIS — E55.9 VITAMIN D DEFICIENCY: ICD-10-CM

## 2023-08-14 DIAGNOSIS — I10 REACTIVE HYPERTENSION: ICD-10-CM

## 2023-08-14 DIAGNOSIS — E03.9 HYPOTHYROIDISM, UNSPECIFIED TYPE: ICD-10-CM

## 2023-08-14 RX ORDER — METOPROLOL SUCCINATE 50 MG/1
75 TABLET, EXTENDED RELEASE ORAL DAILY
Qty: 145 TABLET | Refills: 2 | Status: SHIPPED | OUTPATIENT
Start: 2023-08-14 | End: 2024-03-05 | Stop reason: SDUPTHER

## 2023-08-14 RX ORDER — LEVOTHYROXINE SODIUM 88 UG/1
88 TABLET ORAL DAILY
Qty: 90 TABLET | Refills: 2 | Status: SHIPPED | OUTPATIENT
Start: 2023-08-14 | End: 2024-03-05 | Stop reason: SDUPTHER

## 2023-08-15 ENCOUNTER — APPOINTMENT (OUTPATIENT)
Dept: LAB | Facility: LAB | Age: 78
End: 2023-08-15
Payer: MEDICARE

## 2023-08-15 ENCOUNTER — LAB (OUTPATIENT)
Dept: LAB | Facility: LAB | Age: 78
End: 2023-08-15
Payer: MEDICARE

## 2023-08-15 DIAGNOSIS — M81.8 OTHER OSTEOPOROSIS WITHOUT CURRENT PATHOLOGICAL FRACTURE: ICD-10-CM

## 2023-08-15 PROCEDURE — 82310 ASSAY OF CALCIUM: CPT

## 2023-08-15 PROCEDURE — 82565 ASSAY OF CREATININE: CPT

## 2023-08-15 PROCEDURE — 84520 ASSAY OF UREA NITROGEN: CPT

## 2023-08-15 PROCEDURE — 36415 COLL VENOUS BLD VENIPUNCTURE: CPT

## 2023-08-16 LAB
CALCIUM (MG/DL) IN SER/PLAS: 10.1 MG/DL (ref 8.6–10.6)
CREATININE (MG/DL) IN SER/PLAS: 0.76 MG/DL (ref 0.5–1.05)
GFR FEMALE: 79 ML/MIN/1.73M2
UREA NITROGEN (MG/DL) IN SER/PLAS: 22 MG/DL (ref 6–23)

## 2023-08-23 ENCOUNTER — CLINICAL SUPPORT (OUTPATIENT)
Dept: PRIMARY CARE | Facility: CLINIC | Age: 78
End: 2023-08-23
Payer: MEDICARE

## 2023-08-23 DIAGNOSIS — M81.0 AGE RELATED OSTEOPOROSIS, UNSPECIFIED PATHOLOGICAL FRACTURE PRESENCE: ICD-10-CM

## 2023-08-23 PROCEDURE — 96372 THER/PROPH/DIAG INJ SC/IM: CPT | Performed by: INTERNAL MEDICINE

## 2023-10-02 ENCOUNTER — HOSPITAL ENCOUNTER (OUTPATIENT)
Dept: RADIOLOGY | Facility: CLINIC | Age: 78
Discharge: HOME | End: 2023-10-02
Payer: MEDICARE

## 2023-10-02 DIAGNOSIS — Z12.31 ENCOUNTER FOR SCREENING MAMMOGRAM FOR MALIGNANT NEOPLASM OF BREAST: ICD-10-CM

## 2023-10-02 PROCEDURE — 77067 SCR MAMMO BI INCL CAD: CPT | Mod: BILATERAL PROCEDURE | Performed by: RADIOLOGY

## 2023-10-02 PROCEDURE — 77063 BREAST TOMOSYNTHESIS BI: CPT | Mod: 50

## 2023-10-02 PROCEDURE — 77063 BREAST TOMOSYNTHESIS BI: CPT | Mod: BILATERAL PROCEDURE | Performed by: RADIOLOGY

## 2023-10-05 ENCOUNTER — OFFICE (OUTPATIENT)
Dept: URBAN - METROPOLITAN AREA CLINIC 26 | Facility: CLINIC | Age: 78
End: 2023-10-05

## 2023-10-05 VITALS
DIASTOLIC BLOOD PRESSURE: 68 MMHG | TEMPERATURE: 97.8 F | WEIGHT: 131 LBS | HEART RATE: 71 BPM | HEIGHT: 65 IN | SYSTOLIC BLOOD PRESSURE: 157 MMHG

## 2023-10-05 DIAGNOSIS — K21.9 GASTRO-ESOPHAGEAL REFLUX DISEASE WITHOUT ESOPHAGITIS: ICD-10-CM

## 2023-10-05 DIAGNOSIS — R19.7 DIARRHEA, UNSPECIFIED: ICD-10-CM

## 2023-10-05 PROCEDURE — 99213 OFFICE O/P EST LOW 20 MIN: CPT | Performed by: INTERNAL MEDICINE

## 2023-10-05 RX ORDER — FAMOTIDINE 40 MG/1
40 TABLET, FILM COATED ORAL
Qty: 90 | Refills: 3 | Status: COMPLETED
Start: 2023-10-05 | End: 2024-02-19

## 2023-10-06 ENCOUNTER — OFFICE VISIT (OUTPATIENT)
Dept: PULMONOLOGY | Facility: CLINIC | Age: 78
End: 2023-10-06
Payer: MEDICARE

## 2023-10-06 VITALS
SYSTOLIC BLOOD PRESSURE: 114 MMHG | TEMPERATURE: 97.5 F | DIASTOLIC BLOOD PRESSURE: 68 MMHG | BODY MASS INDEX: 21.79 KG/M2 | OXYGEN SATURATION: 98 % | WEIGHT: 130.8 LBS | HEIGHT: 65 IN

## 2023-10-06 DIAGNOSIS — R93.89 ABNORMAL CT OF THE CHEST: ICD-10-CM

## 2023-10-06 PROCEDURE — 3074F SYST BP LT 130 MM HG: CPT | Performed by: INTERNAL MEDICINE

## 2023-10-06 PROCEDURE — 3078F DIAST BP <80 MM HG: CPT | Performed by: INTERNAL MEDICINE

## 2023-10-06 PROCEDURE — 1160F RVW MEDS BY RX/DR IN RCRD: CPT | Performed by: INTERNAL MEDICINE

## 2023-10-06 PROCEDURE — 1126F AMNT PAIN NOTED NONE PRSNT: CPT | Performed by: INTERNAL MEDICINE

## 2023-10-06 PROCEDURE — 99214 OFFICE O/P EST MOD 30 MIN: CPT | Performed by: INTERNAL MEDICINE

## 2023-10-06 PROCEDURE — 1159F MED LIST DOCD IN RCRD: CPT | Performed by: INTERNAL MEDICINE

## 2023-10-06 PROCEDURE — 1036F TOBACCO NON-USER: CPT | Performed by: INTERNAL MEDICINE

## 2023-10-06 NOTE — PROGRESS NOTES
Subjective   Patient ID: Mary Ibrahim is a 78 y.o. female who presents for Lung Nodule (6 MO CHECK - REVIEW CT CHEST RESULTS) and Results. Primarily abnormal CT of chest, post Covid pneumonia.  HPI  Patient was seen today on a 6-month follow-up visit and I reviewed with her her CT scan of the chest done with contrast.  The infiltrates noted on her CT scan are primarily in the right middle and upper lobe and look postinflammatory.  The remainder of her lung fields are generally unremarkable.  The patient denies any dyspnea with usual daily activities.  She denies any significant phlegm production or coughing.  She is on no bronchodilator therapy at this time.  She has no past history of smoking.  Review of Systems  Patient denies any fever, chills, rhinitis or sore throat.  She has no reflux symptoms.  She does not snore and does not complain of EDS.  She has no lower extremity edema.  All other review of systems were unremarkable.  Refer to the HPI.  Objective   Physical Exam  Pulmonary, lung fields are generally clear bilaterally except for an occasional expiratory rhonchi.  Cardio, heart sounds are regular rate and rhythm.  Extremities, no pretibial edema cyanosis or clubbing.  Psych, the patient was alert and oriented x3.  The patient was not in any respiratory distress.  Assessment/Plan     Assessment:  1.  Abnormal CT scan of the chest which has been unchanged and represents mainly some groundglass changes on the CT scan and primarily in the right upper lung field.  Patient does have a past history of COVID-pneumonia.  2.  No new pulmonary nodules noted on the CT scan.    Plan:  1.  We will plan on repeating a CT scan of the chest without contrast in 1 year.  2.  I instructed the patient to contact our office if she develops any new respiratory complaints.      This note was transcribed using the Dragon Dictation system.  There may be grammatical, punctuation, or verbiage errors that occur with voice  recognition programs.

## 2023-10-20 ENCOUNTER — ANCILLARY PROCEDURE (OUTPATIENT)
Dept: RADIOLOGY | Facility: CLINIC | Age: 78
End: 2023-10-20
Payer: MEDICARE

## 2023-10-20 DIAGNOSIS — E55.9 VITAMIN D DEFICIENCY, UNSPECIFIED: ICD-10-CM

## 2023-10-20 DIAGNOSIS — Z78.0 ASYMPTOMATIC MENOPAUSAL STATE: ICD-10-CM

## 2023-10-20 PROCEDURE — 77080 DXA BONE DENSITY AXIAL: CPT | Performed by: RADIOLOGY

## 2023-10-20 PROCEDURE — 77080 DXA BONE DENSITY AXIAL: CPT

## 2023-10-25 ENCOUNTER — OFFICE VISIT (OUTPATIENT)
Dept: OBSTETRICS AND GYNECOLOGY | Facility: CLINIC | Age: 78
End: 2023-10-25
Payer: MEDICARE

## 2023-10-25 VITALS
BODY MASS INDEX: 21.75 KG/M2 | SYSTOLIC BLOOD PRESSURE: 144 MMHG | WEIGHT: 135.36 LBS | HEART RATE: 62 BPM | OXYGEN SATURATION: 98 % | TEMPERATURE: 96.6 F | DIASTOLIC BLOOD PRESSURE: 80 MMHG | RESPIRATION RATE: 16 BRPM | HEIGHT: 66 IN

## 2023-10-25 DIAGNOSIS — Z46.89 PESSARY MAINTENANCE: ICD-10-CM

## 2023-10-25 DIAGNOSIS — N32.81 OAB (OVERACTIVE BLADDER): Primary | ICD-10-CM

## 2023-10-25 DIAGNOSIS — N95.2 VAGINAL ATROPHY: ICD-10-CM

## 2023-10-25 PROCEDURE — 1036F TOBACCO NON-USER: CPT | Performed by: STUDENT IN AN ORGANIZED HEALTH CARE EDUCATION/TRAINING PROGRAM

## 2023-10-25 PROCEDURE — 3077F SYST BP >= 140 MM HG: CPT | Performed by: STUDENT IN AN ORGANIZED HEALTH CARE EDUCATION/TRAINING PROGRAM

## 2023-10-25 PROCEDURE — 99213 OFFICE O/P EST LOW 20 MIN: CPT | Performed by: STUDENT IN AN ORGANIZED HEALTH CARE EDUCATION/TRAINING PROGRAM

## 2023-10-25 PROCEDURE — 1159F MED LIST DOCD IN RCRD: CPT | Performed by: STUDENT IN AN ORGANIZED HEALTH CARE EDUCATION/TRAINING PROGRAM

## 2023-10-25 PROCEDURE — 1126F AMNT PAIN NOTED NONE PRSNT: CPT | Performed by: STUDENT IN AN ORGANIZED HEALTH CARE EDUCATION/TRAINING PROGRAM

## 2023-10-25 PROCEDURE — 1160F RVW MEDS BY RX/DR IN RCRD: CPT | Performed by: STUDENT IN AN ORGANIZED HEALTH CARE EDUCATION/TRAINING PROGRAM

## 2023-10-25 PROCEDURE — 3079F DIAST BP 80-89 MM HG: CPT | Performed by: STUDENT IN AN ORGANIZED HEALTH CARE EDUCATION/TRAINING PROGRAM

## 2023-10-25 RX ORDER — FAMOTIDINE 40 MG/1
40 TABLET, FILM COATED ORAL DAILY
COMMUNITY
End: 2024-02-27 | Stop reason: ALTCHOICE

## 2023-10-25 ASSESSMENT — PAIN SCALES - GENERAL: PAINLEVEL: 0-NO PAIN

## 2023-10-25 NOTE — PROGRESS NOTES
HISTORY OF PRESENT ILLNESS:  Mary Ibrahim is a 78 y.o. female, who presents in follow up for POP, vaginal atrophy     During last encounter on 22 with Thea Bello CNP, reviewed and agreed to the followin. Uterine prolapse, pessary maintenance   - Pessary removed, cleaned, and speculum exam done and revealed no issues with vaginal ulcers or granulation tissues. Pessary was placed back.  - Continue home maintenance for pessary     2. Vaginal atrophy  - Continue using tv estrogen cream 2-3x per week hs as prescribed. Declines refill for this today; will call for refills.      3. Vulvar irritation  - Recommended a barrier ointment such as Vaseline or Aquaphor to prevent vulvar skin irritation secondary to wearing pads.      Follow-up in 1 year with Dr. Gail Juarez or RTC sooner with new/worsening problems.     Today she reports   No concerns, managing pessary on her own without difficulty. Bladder is overall ok but occasionally will have UUI.     Using estradiol twice weekly.    The following were reviewed to gain additional history:  External notes: Dr. Esparza note 22, health maintenance, BP high in office but good at home  Test results: Cr 0.76 8/15/23          PHYSICAL EXAMINATION:  No LMP recorded.  There is no height or weight on file to calculate BMI.  There were no vitals taken for this visit.    General Appearance: well appearing  Neuro: Alert and oriented   HEENT: mucous membranes moist, neck supple  Resp: No respiratory distress, normal work of breathing  MSK: normal range of motion, gait appropriate    Pelvic:  Chaperone for pelvic exam:   Genitourinary:  normal external genitalia, Bartholin's glands, Patmos's glands negative,   Urethra normal meatus, non-tender, no periurethral mass  Vaginal mucosa  normal  Cervix  normal  Uterus normal size, nontender  Adnexae  negative nontender, no masses  Atrophy negative    CST negative    POP-Q (in supine position):  Unchanged from  previous    Rectal: no hemorrhoids, fissures or masses.    Urine dip:  No results found for this or any previous visit.       IMPRESSION AND PLAN:  79 y/o follow up for POP, OAB    POP  - continue pessary maintenance at home  - yearly pelvic exam    Vaginal atrophy  - continue estrogen as above, does not need refill today    OAB  - declines management at this time  - reviewed could try medication in future if she desires  - will monitor    All questions and concerns were answered and addressed.  The patient expressed understanding and agrees with the plan.     RTC one year    Gail Juarez MD

## 2023-12-20 ENCOUNTER — TELEPHONE (OUTPATIENT)
Dept: PRIMARY CARE | Facility: CLINIC | Age: 78
End: 2023-12-20
Payer: MEDICARE

## 2023-12-20 NOTE — TELEPHONE ENCOUNTER
Patient called back and and said radiology HAS the order and they are contacting insurance.   Disregard previous message

## 2023-12-20 NOTE — TELEPHONE ENCOUNTER
Patient called and needs an order in for an ultrasound of gallbladder. She is scheduled for 1/3/24 but the order is not in the system yet. She also said we need to have insurance authorization.   Mary [phone]

## 2024-01-03 ENCOUNTER — ANCILLARY PROCEDURE (OUTPATIENT)
Dept: RADIOLOGY | Facility: CLINIC | Age: 79
End: 2024-01-03
Payer: MEDICARE

## 2024-01-03 DIAGNOSIS — K82.4 CHOLESTEROLOSIS OF GALLBLADDER: ICD-10-CM

## 2024-01-03 PROCEDURE — 76705 ECHO EXAM OF ABDOMEN: CPT

## 2024-01-03 PROCEDURE — 76705 ECHO EXAM OF ABDOMEN: CPT | Performed by: RADIOLOGY

## 2024-01-10 ENCOUNTER — LAB (OUTPATIENT)
Dept: LAB | Facility: LAB | Age: 79
End: 2024-01-10
Payer: MEDICARE

## 2024-01-10 DIAGNOSIS — M81.0 AGE-RELATED OSTEOPOROSIS WITHOUT CURRENT PATHOLOGICAL FRACTURE: ICD-10-CM

## 2024-01-10 DIAGNOSIS — Z79.899 CURRENT USE OF PROTON PUMP INHIBITOR: ICD-10-CM

## 2024-01-10 DIAGNOSIS — E03.9 HYPOTHYROIDISM, UNSPECIFIED TYPE: ICD-10-CM

## 2024-01-10 DIAGNOSIS — R12 HEARTBURN: ICD-10-CM

## 2024-01-10 DIAGNOSIS — R73.09 ELEVATED GLUCOSE LEVEL: ICD-10-CM

## 2024-01-10 DIAGNOSIS — I10 REACTIVE HYPERTENSION: ICD-10-CM

## 2024-01-10 LAB
25(OH)D3 SERPL-MCNC: 61 NG/ML (ref 30–100)
ALBUMIN SERPL BCP-MCNC: 4 G/DL (ref 3.4–5)
ALP SERPL-CCNC: 65 U/L (ref 33–136)
ALT SERPL W P-5'-P-CCNC: 16 U/L (ref 7–45)
ANION GAP SERPL CALC-SCNC: 9 MMOL/L (ref 10–20)
AST SERPL W P-5'-P-CCNC: 22 U/L (ref 9–39)
BASOPHILS # BLD AUTO: 0.04 X10*3/UL (ref 0–0.1)
BASOPHILS NFR BLD AUTO: 0.6 %
BILIRUB SERPL-MCNC: 0.6 MG/DL (ref 0–1.2)
BUN SERPL-MCNC: 18 MG/DL (ref 6–23)
CALCIUM SERPL-MCNC: 9.9 MG/DL (ref 8.6–10.6)
CHLORIDE SERPL-SCNC: 105 MMOL/L (ref 98–107)
CHOLEST SERPL-MCNC: 145 MG/DL (ref 0–199)
CHOLESTEROL/HDL RATIO: 2.6
CO2 SERPL-SCNC: 33 MMOL/L (ref 21–32)
CREAT SERPL-MCNC: 0.79 MG/DL (ref 0.5–1.05)
CREAT UR-MCNC: 18.5 MG/DL (ref 20–320)
EGFRCR SERPLBLD CKD-EPI 2021: 76 ML/MIN/1.73M*2
EOSINOPHIL # BLD AUTO: 0.09 X10*3/UL (ref 0–0.4)
EOSINOPHIL NFR BLD AUTO: 1.4 %
ERYTHROCYTE [DISTWIDTH] IN BLOOD BY AUTOMATED COUNT: 12.9 % (ref 11.5–14.5)
EST. AVERAGE GLUCOSE BLD GHB EST-MCNC: 117 MG/DL
GLUCOSE SERPL-MCNC: 90 MG/DL (ref 74–99)
HBA1C MFR BLD: 5.7 %
HCT VFR BLD AUTO: 43.7 % (ref 36–46)
HDLC SERPL-MCNC: 56.2 MG/DL
HGB BLD-MCNC: 13.8 G/DL (ref 12–16)
IMM GRANULOCYTES # BLD AUTO: 0.01 X10*3/UL (ref 0–0.5)
IMM GRANULOCYTES NFR BLD AUTO: 0.2 % (ref 0–0.9)
LDLC SERPL CALC-MCNC: 73 MG/DL
LYMPHOCYTES # BLD AUTO: 1.44 X10*3/UL (ref 0.8–3)
LYMPHOCYTES NFR BLD AUTO: 22 %
MCH RBC QN AUTO: 29.7 PG (ref 26–34)
MCHC RBC AUTO-ENTMCNC: 31.6 G/DL (ref 32–36)
MCV RBC AUTO: 94 FL (ref 80–100)
MICROALBUMIN UR-MCNC: <7 MG/L
MICROALBUMIN/CREAT UR: ABNORMAL MG/G{CREAT}
MONOCYTES # BLD AUTO: 0.84 X10*3/UL (ref 0.05–0.8)
MONOCYTES NFR BLD AUTO: 12.8 %
NEUTROPHILS # BLD AUTO: 4.14 X10*3/UL (ref 1.6–5.5)
NEUTROPHILS NFR BLD AUTO: 63 %
NON HDL CHOLESTEROL: 89 MG/DL (ref 0–149)
NRBC BLD-RTO: 0 /100 WBCS (ref 0–0)
PLATELET # BLD AUTO: 156 X10*3/UL (ref 150–450)
POTASSIUM SERPL-SCNC: 4 MMOL/L (ref 3.5–5.3)
PROT SERPL-MCNC: 6.8 G/DL (ref 6.4–8.2)
RBC # BLD AUTO: 4.65 X10*6/UL (ref 4–5.2)
SODIUM SERPL-SCNC: 143 MMOL/L (ref 136–145)
TRIGL SERPL-MCNC: 79 MG/DL (ref 0–149)
TSH SERPL-ACNC: 1.58 MIU/L (ref 0.44–3.98)
VIT B12 SERPL-MCNC: 467 PG/ML (ref 211–911)
VLDL: 16 MG/DL (ref 0–40)
WBC # BLD AUTO: 6.6 X10*3/UL (ref 4.4–11.3)

## 2024-01-10 PROCEDURE — 82043 UR ALBUMIN QUANTITATIVE: CPT

## 2024-01-10 PROCEDURE — 80061 LIPID PANEL: CPT

## 2024-01-10 PROCEDURE — 83036 HEMOGLOBIN GLYCOSYLATED A1C: CPT

## 2024-01-10 PROCEDURE — 80053 COMPREHEN METABOLIC PANEL: CPT

## 2024-01-10 PROCEDURE — 85025 COMPLETE CBC W/AUTO DIFF WBC: CPT

## 2024-01-10 PROCEDURE — 84443 ASSAY THYROID STIM HORMONE: CPT

## 2024-01-10 PROCEDURE — 82570 ASSAY OF URINE CREATININE: CPT

## 2024-01-10 PROCEDURE — 82306 VITAMIN D 25 HYDROXY: CPT

## 2024-01-10 PROCEDURE — 36415 COLL VENOUS BLD VENIPUNCTURE: CPT

## 2024-01-10 PROCEDURE — 82607 VITAMIN B-12: CPT

## 2024-01-16 DIAGNOSIS — M81.0 AGE RELATED OSTEOPOROSIS, UNSPECIFIED PATHOLOGICAL FRACTURE PRESENCE: Primary | ICD-10-CM

## 2024-01-16 RX ORDER — DENOSUMAB 60 MG/ML
INJECTION SUBCUTANEOUS
Qty: 1 ML | Refills: 0 | Status: SHIPPED | OUTPATIENT
Start: 2024-01-16 | End: 2025-01-15

## 2024-02-06 PROCEDURE — RXMED WILLOW AMBULATORY MEDICATION CHARGE

## 2024-02-19 ENCOUNTER — PHARMACY VISIT (OUTPATIENT)
Dept: PHARMACY | Facility: CLINIC | Age: 79
End: 2024-02-19
Payer: MEDICARE

## 2024-02-19 ENCOUNTER — SPECIALTY PHARMACY (OUTPATIENT)
Dept: PHARMACY | Facility: CLINIC | Age: 79
End: 2024-02-19

## 2024-02-19 ENCOUNTER — OFFICE (OUTPATIENT)
Dept: URBAN - METROPOLITAN AREA CLINIC 26 | Facility: CLINIC | Age: 79
End: 2024-02-19

## 2024-02-19 VITALS
HEART RATE: 64 BPM | SYSTOLIC BLOOD PRESSURE: 163 MMHG | WEIGHT: 133 LBS | TEMPERATURE: 97.6 F | HEIGHT: 65 IN | DIASTOLIC BLOOD PRESSURE: 63 MMHG

## 2024-02-19 DIAGNOSIS — K21.9 GASTRO-ESOPHAGEAL REFLUX DISEASE WITHOUT ESOPHAGITIS: ICD-10-CM

## 2024-02-19 PROCEDURE — 99213 OFFICE O/P EST LOW 20 MIN: CPT | Performed by: INTERNAL MEDICINE

## 2024-02-19 RX ORDER — FAMOTIDINE 40 MG/1
40 TABLET, FILM COATED ORAL
Qty: 90 | Refills: 3 | Status: COMPLETED
Start: 2023-10-05 | End: 2024-02-19

## 2024-02-21 ENCOUNTER — APPOINTMENT (OUTPATIENT)
Dept: PRIMARY CARE | Facility: CLINIC | Age: 79
End: 2024-02-21
Payer: MEDICARE

## 2024-02-25 NOTE — PROGRESS NOTES
"Subjective   Reason for Visit: Mary Ibrahim is an 78 y.o. female here for a Medicare Wellness visit. And annual check up, follow up  LAST VISIT PLAN 6/13/23  Past Medical, Surgical, and Family History reviewed and updated in chart.  Assessment/Plan   Problem List Items Addressed This Visit       Hypothyroidism    Relevant Orders    Lipid Panel    TSH with reflex to Free T4 if abnormal    Osteoporosis    Relevant Orders    Vitamin D, Total    Reactive hypertension - Primary    Relevant Orders    Lipid Panel    Comprehensive Metabolic Panel    Albumin , Urine Random   Her home bp's are good and her cuff is accurate. Not clear why she is getting a high bp in the office now. Is ok at home, no med changes, some labs will be due.  Other Visit Diagnoses       Elevated glucose level        Relevant Orders    Comprehensive Metabolic Panel    Hemoglobin A1C    Heartburn        Relevant Orders    CBC and Auto Differential    US gallbladder  Change antacid to a non calcium containing produc, perhaps she getting calcium rebound.    Current use of proton pump inhibitor        Relevant Orders    Vitamin B12    Dyspepsia        Relevant Orders    US gallbladder        .Pt instructions:  \"Keep same medications.  Your home blood pressures are ok and your blood pressure cuff is accurate.  Change batteries every 90 days.    Follow up jan/February for your annual and recheck on blood pressure.  Call if problems.  Flu shot in the fall.  Keep prolia appt. August here.    For heartburn at night, take mylanta or gaviscon (antacid that is not calcium based) at night if needed.  Continue tums after supper and nexium before bed.  Schedule ultrasound of gallbladder.  If this is not helping, consider increasing the dose of the esomeprazole form 20 mg to 40 mg, and discussing with Dr Marin.\"     END INFO FROM PRIOR VISIT   HPI here for mcw visit and annual check up. As well as follow up   Feels well generally   Second handicapped sone " moved out aug 2014, mental age of 1, is 29 yo, they see himon Sundays.  Other handicapped sone moved in 2014 to [facility]/group home and they see him as well.  At home,  and son anat who is a teacher.  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  Mammogram: 10/2023, cat 1 negative  Bone Density: 10/2023 improved to osteopenia  Urine albumin if HTN or DM2: 1/10/24 <7.0 mg/L  Pap: 2/2020- neg  Pelvic done by urogyn October 2023, note reviewed, normal. Has pessary.   Vaccines due or not completed:  had 2023 fall: flu, covid, rsv  Last Health Maintenance exam: 3/23/22  All medicare screening items reviewed.  Nonsmoker, non alcohol, phq2 zero  Not anxious  Only c/o is left shoulder but has good rom and does not prevenet her from doing much    Ascvd risk really jumped up likely as initial bp here is high. Is 42 %, used to be 28%  Home bp readings are 105-130 mostly in the 110-130 systolic and diastolics are low, 44-64 mostly in the 50's  Heart rates 60 average.  Had mild cac on ct chests. Discussed statin with her.    Would be recommended for anti plaque effect.  Ct chest with residual residue from covid , dr muñoz said ok ct chest at one year and she has an order from them for fall 2024. And appt with him oct 2024.  No coughing. Or sob or wheezing.    Labs done January ldl 73, glucose 150 A1c 5.7  Urine albumin neg    Scribe Transcription:  She said her insurance changed her from brand name synthroid ot Levothyroxine after the first of the year. She is just finishing her brand name Synthroid that she had before. Her annual thyroid blood test in January was done on Synthroid so we will add a TSH to her May blood work to be sure the generic is working.     She reports doing some swimming aerobics in [place].     Scribe Attestation  By signing my name below, I, Elias Bey, Beatriz   attest that this  documentation has been prepared under the direction and in the presence of Mouna Esparza MD.s  Patient Care Team:  Mouna Esparza MD as PCP - General  Mouna Esparza MD as PCP - [employer] Medicare Advantage PCP  Mouna Esparza MD as PCP - [employer] Medicare Advantage PCP     Review of Systems  .All systems reviewed and are negative unless otherwise stated in HPI or noted in writing on the written   7 page history and review of systems document reviewed by me and  scanned in with this visit.    Chronic leg swelling due to varicose veins  A little lightheaded when gets up quickly from lying to do eye drops occurs a little more often.    She left shoulder pain: she lays more on her left side to help her stomach and esophagus issues and that bothers her shoulder.  No falls.  Has tried to do shoulder stretches. Can pull son out of reclinver without pain, cannot make it hurt. Bothers her when she moves it the wrong way. No activity limitations.      Objective   Lab Results   Component Value Date    WBC 6.6 01/10/2024    HGB 13.8 01/10/2024    HCT 43.7 01/10/2024     01/10/2024    CHOL 145 01/10/2024    TRIG 79 01/10/2024    HDL 56.2 01/10/2024    ALT 16 01/10/2024    AST 22 01/10/2024     01/10/2024    K 4.0 01/10/2024     01/10/2024    CREATININE 0.79 01/10/2024    BUN 18 01/10/2024    CO2 33 (H) 01/10/2024    TSH 1.58 01/10/2024    HGBA1C 5.7 (H) 01/10/2024      Latest Reference Range & Units 01/10/24 10:01   GLUCOSE 74 - 99 mg/dL 90   SODIUM 136 - 145 mmol/L 143   POTASSIUM 3.5 - 5.3 mmol/L 4.0   CHLORIDE 98 - 107 mmol/L 105   Bicarbonate 21 - 32 mmol/L 33 (H)   Anion Gap 10 - 20 mmol/L 9 (L)   Blood Urea Nitrogen 6 - 23 mg/dL 18   Creatinine 0.50 - 1.05 mg/dL 0.79   EGFR >60 mL/min/1.73m*2 76   Calcium 8.6 - 10.6 mg/dL 9.9   Albumin 3.4 - 5.0 g/dL 4.0   Alkaline Phosphatase 33 - 136 U/L 65   ALT 7 - 45 U/L 16   AST 9 - 39 U/L 22   Bilirubin Total 0.0 - 1.2 mg/dL 0.6   HDL CHOLESTEROL mg/dL 56.2  "  Cholesterol/HDL Ratio  2.6   LDL Calculated <=99 mg/dL 73   VLDL 0 - 40 mg/dL 16   TRIGLYCERIDES 0 - 149 mg/dL 79   Non HDL Cholesterol 0 - 149 mg/dL 89   Total Protein 6.4 - 8.2 g/dL 6.8   CHOLESTEROL 0 - 199 mg/dL 145   Vitamin B12 211 - 911 pg/mL 467   Hemoglobin A1C see below % 5.7 (H)   Thyroid Stimulating Hormone 0.44 - 3.98 mIU/L 1.58   Vitamin D, 25-Hydroxy, Total 30 - 100 ng/mL 61   Estimated Average Glucose Not Established mg/dL 117   WBC 4.4 - 11.3 x10*3/uL 6.6   nRBC 0.0 - 0.0 /100 WBCs 0.0   RBC 4.00 - 5.20 x10*6/uL 4.65   HEMOGLOBIN 12.0 - 16.0 g/dL 13.8   HEMATOCRIT 36.0 - 46.0 % 43.7   MCV 80 - 100 fL 94   MCH 26.0 - 34.0 pg 29.7   MCHC 32.0 - 36.0 g/dL 31.6 (L)   RED CELL DISTRIBUTION WIDTH 11.5 - 14.5 % 12.9   Platelets 150 - 450 x10*3/uL 156   Neutrophils % 40.0 - 80.0 % 63.0   Immature Granulocytes %, Automated 0.0 - 0.9 % 0.2   Lymphocytes % 13.0 - 44.0 % 22.0   Monocytes % 2.0 - 10.0 % 12.8   Eosinophils % 0.0 - 6.0 % 1.4   Basophils % 0.0 - 2.0 % 0.6   Neutrophils Absolute 1.60 - 5.50 x10*3/uL 4.14   Immature Granulocytes Absolute, Automated 0.00 - 0.50 x10*3/uL 0.01   Lymphocytes Absolute 0.80 - 3.00 x10*3/uL 1.44   Monocytes Absolute 0.05 - 0.80 x10*3/uL 0.84 (H)   Eosinophils Absolute 0.00 - 0.40 x10*3/uL 0.09   Basophils Absolute 0.00 - 0.10 x10*3/uL 0.04   Albumin, Urine Random Not established mg/L <7.0   Creatinine, Urine Random 20.0 - 320.0 mg/dL 18.5 (L)   Albumin/Creatine Ratio  COMMENT ONLY     Vitals:  /62   Pulse 62   Ht 1.651 m (5' 5\") Comment: without shoes.  Wt 61.3 kg (135 lb 3.2 oz)   BMI 22.50 kg/m²         3/14/2023    12:01 PM 3/28/2023     1:46 PM 6/13/2023    11:33 AM 6/13/2023    12:41 PM 10/6/2023    10:45 AM 10/25/2023    10:44 AM 2/27/2024     9:57 AM   Vitals   Systolic 160 128 144 124 114 144 154   Diastolic 78 70 64 56 68 80 62   Heart Rate 70 88 64   62 62   Temp  36.5 °C (97.7 °F)   36.4 °C (97.5 °F) 35.9 °C (96.6 °F)    Resp   18   16    Height (in)  " "1.651 m (5' 5\") 1.651 m (5' 5\")  1.651 m (5' 5\") 1.676 m (5' 6\") 1.651 m (5' 5\")   Weight (lb)  128.4 130  130.8 135.36 135.2   BMI  21.37 kg/m2 21.63 kg/m2  21.77 kg/m2 21.85 kg/m2 22.5 kg/m2   BSA (m2)  1.63 m2 1.64 m2  1.65 m2 1.69 m2 1.68 m2   Visit Report Report  Report Report Report Report Report     Home bp readings are 105-130 mostly in the 110-130 systolic and diastolics are low, 44-64 mostly in the 50's  Heart rates 60 average.    Physical Exam  Physical Exam  General: Patient is known to me, looks well, is in usual state of health, with  no obvious distress.  Head: normocephalic  Eyes: PERRL, EOMI, no scleral icterus or conjunctival abnormality  Ears:Normal canals and TM's  Oropharynx: no lesions or exudates , within normal limits  Neck: No masses, no cervical (A/P/ or Lateral) adenopathy.   Thyroid not enlarged -has part of right lobe removed. no nodules.   Carotid pulses normal and no bruits.  Chest: Normal AP diameter. No supraclavicular adenopathy.  Lungs: Clear to auscultation: no rales , wheezes, rhonchi, rubs, examined bilaterally, ant/post /lateral. RR normal.  Heart: RRR. No MGCR S1 S2 normal.  Abd: BS normal, no bruits. No hepatosplenomegaly. No abdominal mass or tenderness. No distension.  Extremities: No upper extremity edema. No lower leg edema.No ischemia.   Has extensive varicosities bilateral lower legs and feet not new.  Has some chronic deformities of toes- callus right plantar 3rd,is looking better, they work on this at home.  Joints: no synovitis seen. No deformities.  Pulses: normal posterior tibialis pulses, normal dorsalis pedis pulses. normal radial pulses. Normal femoral pulses without bruits.  Neuro: CN 2-12 intact . Alert, oriented, appropriate.  No focal weakness observed. No tremors. Ambulation is normal .  Psych: Mood and affect normal. No cognitive deficits noted during this exam. Alert, oriented, appropriate.  Skin: No rash, petechiae or excessive bruising.  Lymph: no SC " axillary or inguinal adenopathy   Breast Exam : Symmetric appearance. No masses, nipple discharge, skin retraction, axillary or supraclavicular adenopathy.     Pelvic per gyn    Mary was seen today for medicare annual wellness visit subsequent.  Diagnoses and all orders for this visit:  Encounter for subsequent annual wellness visit (AWV) in Medicare patient (Primary)  Annual visit for general adult medical examination with abnormal findings  Encounter for screening for other disorder  Full code status  Coronary artery calcification  -     pravastatin (Pravachol) 20 mg tablet; Take 1 tablet (20 mg) by mouth once daily.  -     Creatine Kinase; Future  -     Lipid Panel; Future  -     Aspartate Aminotransferase; Future  -     Alanine Aminotransferase; Future  [place] cardiac risk >20% in next 10 years  -     pravastatin (Pravachol) 20 mg tablet; Take 1 tablet (20 mg) by mouth once daily.  -     Creatine Kinase; Future  -     Lipid Panel; Future  -     Aspartate Aminotransferase; Future  -     Alanine Aminotransferase; Future  Cardiac risk counseling  Encounter for monitoring statin therapy  -     Cancel: Creatine Kinase; Future  -     Cancel: Lipid Panel; Future  -     Cancel: Aspartate Aminotransferase; Future  -     Cancel: Alanine Aminotransferase; Future  -     Creatine Kinase; Future  -     Lipid Panel; Future  -     Aspartate Aminotransferase; Future  -     Alanine Aminotransferase; Future  Elevated low density lipoprotein (LDL) cholesterol level  -     Cancel: Lipid Panel; Future  -     Cancel: Aspartate Aminotransferase; Future  -     Cancel: Alanine Aminotransferase; Future  Elevated glucose  -     Cancel: Hemoglobin A1c; Future  -     Cancel: Glucose, fasting; Future  -     Glucose, fasting; Future  -     Hemoglobin A1c; Future  Encounter for hepatitis C screening test for low risk patient  -     Cancel: Hepatitis C Antibody; Future  -     Hepatitis C Antibody; Future  Hypothyroidism due to Hashimoto's  thyroiditis  -     TSH with reflex to Free T4 if abnormal; Future  White coat syndrome with diagnosis of hypertension  Age related osteoporosis, unspecified pathological fracture presence  -     denosumab (Prolia) injection 60 mg  Granulomatous lung disease (CMS/HCC)  Comments:  lung changes on ct from covid being monitored by pulm med.  Other orders  -     Full code           Annual  history and physical completed including  ROS, PE.   Medicare wellness visit  completed including:  Immunizations,   Health Maintenance items,  Discussion of advanced directives and code status, which is: full code ok cpr, has hcpoa-prefers not to do lw.  Fall risk and prevention addressed.  Functionality and pain were assessed.   Cancer screening testing was addressed as appropriate-see patient discussion/orders.   Medications were discussed and reviewed/reconciled and refill need assessed/handled.   Patient states taking their medication regularly and appropriately.  Also:   Depression screening PhQ-2 completed: neg  Alcohol misuse screen: neg    In addition to the wellness visit and screening, the above medical issues were addressed:  As well as results of testing completed since last visit.    Bp is concerning, white coat htn can be something that needs further treatment, she will montior more closely at home and follow up.  Will avoid oral nsaids for shoulder, try topical.  Start pravastatin, her ascvd risk is 43%.  Risks benefits s/e discussed. Benefit outweighs risks.  Labs to monitor.    Osteoporosis, she is due for prolia injecton. Last labs were ok and within a reasonable time frame, nurse given th eok to proceed with prolia. Next dose in 6 months, dexa due oct nov 2025    75 min visit.

## 2024-02-27 ENCOUNTER — OFFICE VISIT (OUTPATIENT)
Dept: PRIMARY CARE | Facility: CLINIC | Age: 79
End: 2024-02-27
Payer: MEDICARE

## 2024-02-27 VITALS
HEART RATE: 62 BPM | HEIGHT: 65 IN | SYSTOLIC BLOOD PRESSURE: 154 MMHG | DIASTOLIC BLOOD PRESSURE: 62 MMHG | BODY MASS INDEX: 22.53 KG/M2 | WEIGHT: 135.2 LBS

## 2024-02-27 DIAGNOSIS — Z71.89 CARDIAC RISK COUNSELING: ICD-10-CM

## 2024-02-27 DIAGNOSIS — E78.00 ELEVATED LOW DENSITY LIPOPROTEIN (LDL) CHOLESTEROL LEVEL: ICD-10-CM

## 2024-02-27 DIAGNOSIS — Z00.00 ENCOUNTER FOR SUBSEQUENT ANNUAL WELLNESS VISIT (AWV) IN MEDICARE PATIENT: Primary | ICD-10-CM

## 2024-02-27 DIAGNOSIS — Z91.89 FRAMINGHAM CARDIAC RISK >20% IN NEXT 10 YEARS: ICD-10-CM

## 2024-02-27 DIAGNOSIS — I10 WHITE COAT SYNDROME WITH DIAGNOSIS OF HYPERTENSION: ICD-10-CM

## 2024-02-27 DIAGNOSIS — I25.10 CORONARY ARTERY CALCIFICATION: ICD-10-CM

## 2024-02-27 DIAGNOSIS — Z11.59 ENCOUNTER FOR HEPATITIS C SCREENING TEST FOR LOW RISK PATIENT: ICD-10-CM

## 2024-02-27 DIAGNOSIS — E06.3 HYPOTHYROIDISM DUE TO HASHIMOTO'S THYROIDITIS: ICD-10-CM

## 2024-02-27 DIAGNOSIS — Z51.81 ENCOUNTER FOR MONITORING STATIN THERAPY: ICD-10-CM

## 2024-02-27 DIAGNOSIS — Z00.01 ANNUAL VISIT FOR GENERAL ADULT MEDICAL EXAMINATION WITH ABNORMAL FINDINGS: ICD-10-CM

## 2024-02-27 DIAGNOSIS — Z78.9 FULL CODE STATUS: ICD-10-CM

## 2024-02-27 DIAGNOSIS — I25.84 CORONARY ARTERY CALCIFICATION: ICD-10-CM

## 2024-02-27 DIAGNOSIS — I10 REACTIVE HYPERTENSION: ICD-10-CM

## 2024-02-27 DIAGNOSIS — R73.09 ELEVATED GLUCOSE: ICD-10-CM

## 2024-02-27 DIAGNOSIS — Z13.89 ENCOUNTER FOR SCREENING FOR OTHER DISORDER: ICD-10-CM

## 2024-02-27 DIAGNOSIS — M81.0 AGE RELATED OSTEOPOROSIS, UNSPECIFIED PATHOLOGICAL FRACTURE PRESENCE: ICD-10-CM

## 2024-02-27 DIAGNOSIS — E03.8 HYPOTHYROIDISM DUE TO HASHIMOTO'S THYROIDITIS: ICD-10-CM

## 2024-02-27 DIAGNOSIS — Z79.899 ENCOUNTER FOR MONITORING STATIN THERAPY: ICD-10-CM

## 2024-02-27 DIAGNOSIS — J84.10 GRANULOMATOUS LUNG DISEASE (MULTI): ICD-10-CM

## 2024-02-27 PROCEDURE — 1157F ADVNC CARE PLAN IN RCRD: CPT | Performed by: INTERNAL MEDICINE

## 2024-02-27 PROCEDURE — 1160F RVW MEDS BY RX/DR IN RCRD: CPT | Performed by: INTERNAL MEDICINE

## 2024-02-27 PROCEDURE — 3077F SYST BP >= 140 MM HG: CPT | Performed by: INTERNAL MEDICINE

## 2024-02-27 PROCEDURE — 3078F DIAST BP <80 MM HG: CPT | Performed by: INTERNAL MEDICINE

## 2024-02-27 PROCEDURE — 96372 THER/PROPH/DIAG INJ SC/IM: CPT | Performed by: INTERNAL MEDICINE

## 2024-02-27 PROCEDURE — 1170F FXNL STATUS ASSESSED: CPT | Performed by: INTERNAL MEDICINE

## 2024-02-27 PROCEDURE — 1036F TOBACCO NON-USER: CPT | Performed by: INTERNAL MEDICINE

## 2024-02-27 PROCEDURE — 99397 PER PM REEVAL EST PAT 65+ YR: CPT | Performed by: INTERNAL MEDICINE

## 2024-02-27 PROCEDURE — G0439 PPPS, SUBSEQ VISIT: HCPCS | Performed by: INTERNAL MEDICINE

## 2024-02-27 PROCEDURE — G0444 DEPRESSION SCREEN ANNUAL: HCPCS | Performed by: INTERNAL MEDICINE

## 2024-02-27 PROCEDURE — 99214 OFFICE O/P EST MOD 30 MIN: CPT | Performed by: INTERNAL MEDICINE

## 2024-02-27 PROCEDURE — 1123F ACP DISCUSS/DSCN MKR DOCD: CPT | Performed by: INTERNAL MEDICINE

## 2024-02-27 PROCEDURE — 1126F AMNT PAIN NOTED NONE PRSNT: CPT | Performed by: INTERNAL MEDICINE

## 2024-02-27 PROCEDURE — 1159F MED LIST DOCD IN RCRD: CPT | Performed by: INTERNAL MEDICINE

## 2024-02-27 RX ORDER — PRAVASTATIN SODIUM 20 MG/1
20 TABLET ORAL DAILY
Qty: 90 TABLET | Refills: 3 | Status: SHIPPED | OUTPATIENT
Start: 2024-02-27 | End: 2025-02-26

## 2024-02-27 SDOH — ECONOMIC STABILITY: FOOD INSECURITY: WITHIN THE PAST 12 MONTHS, YOU WORRIED THAT YOUR FOOD WOULD RUN OUT BEFORE YOU GOT MONEY TO BUY MORE.: NEVER TRUE

## 2024-02-27 SDOH — ECONOMIC STABILITY: GENERAL
WHICH OF THE FOLLOWING WOULD YOU LIKE TO GET CONNECTED TO IN ORDER TO RECEIVE A DISCOUNT OR FOR FREE? (CHOOSE ALL THAT APPLY): NONE OF THESE

## 2024-02-27 SDOH — HEALTH STABILITY: PHYSICAL HEALTH: ON AVERAGE, HOW MANY DAYS PER WEEK DO YOU ENGAGE IN MODERATE TO STRENUOUS EXERCISE (LIKE A BRISK WALK)?: 5 DAYS

## 2024-02-27 SDOH — ECONOMIC STABILITY: INCOME INSECURITY: IN THE LAST 12 MONTHS, WAS THERE A TIME WHEN YOU WERE NOT ABLE TO PAY THE MORTGAGE OR RENT ON TIME?: NO

## 2024-02-27 SDOH — HEALTH STABILITY: PHYSICAL HEALTH: ON AVERAGE, HOW MANY MINUTES DO YOU ENGAGE IN EXERCISE AT THIS LEVEL?: 30 MIN

## 2024-02-27 SDOH — ECONOMIC STABILITY: FOOD INSECURITY: WITHIN THE PAST 12 MONTHS, THE FOOD YOU BOUGHT JUST DIDN'T LAST AND YOU DIDN'T HAVE MONEY TO GET MORE.: NEVER TRUE

## 2024-02-27 SDOH — ECONOMIC STABILITY: HOUSING INSECURITY: IN THE LAST 12 MONTHS, HOW MANY PLACES HAVE YOU LIVED?: 1

## 2024-02-27 SDOH — ECONOMIC STABILITY: GENERAL
WHICH OF THE FOLLOWING DO YOU KNOW HOW TO USE AND HAVE ACCESS TO EVERY DAY? (CHOOSE ALL THAT APPLY): SMARTPHONE WITH CELLULAR DATA PLAN;DESKTOP COMPUTER, LAPTOP COMPUTER, OR TABLET WITH BROADBAND INTERNET CONNECTION

## 2024-02-27 ASSESSMENT — SOCIAL DETERMINANTS OF HEALTH (SDOH)
IN A TYPICAL WEEK, HOW MANY TIMES DO YOU TALK ON THE PHONE WITH FAMILY, FRIENDS, OR NEIGHBORS?: THREE TIMES A WEEK
WITHIN THE LAST YEAR, HAVE YOU BEEN HUMILIATED OR EMOTIONALLY ABUSED IN OTHER WAYS BY YOUR PARTNER OR EX-PARTNER?: NO
WITHIN THE LAST YEAR, HAVE TO BEEN RAPED OR FORCED TO HAVE ANY KIND OF SEXUAL ACTIVITY BY YOUR PARTNER OR EX-PARTNER?: NO
DO YOU BELONG TO ANY CLUBS OR ORGANIZATIONS SUCH AS CHURCH GROUPS UNIONS, FRATERNAL OR ATHLETIC GROUPS, OR SCHOOL GROUPS?: NO
HOW OFTEN DO YOU GET TOGETHER WITH FRIENDS OR RELATIVES?: ONCE A WEEK
HOW HARD IS IT FOR YOU TO PAY FOR THE VERY BASICS LIKE FOOD, HOUSING, MEDICAL CARE, AND HEATING?: NOT HARD AT ALL
HOW OFTEN DO YOU ATTENT MEETINGS OF THE CLUB OR ORGANIZATION YOU BELONG TO?: NEVER
HOW OFTEN DO YOU ATTEND CHURCH OR RELIGIOUS SERVICES?: 1 TO 4 TIMES PER YEAR
WITHIN THE LAST YEAR, HAVE YOU BEEN KICKED, HIT, SLAPPED, OR OTHERWISE PHYSICALLY HURT BY YOUR PARTNER OR EX-PARTNER?: NO
IN THE PAST 12 MONTHS, HAS THE ELECTRIC, GAS, OIL, OR WATER COMPANY THREATENED TO SHUT OFF SERVICE IN YOUR HOME?: NO
WITHIN THE LAST YEAR, HAVE YOU BEEN AFRAID OF YOUR PARTNER OR EX-PARTNER?: NO

## 2024-02-27 ASSESSMENT — ANXIETY QUESTIONNAIRES
4. TROUBLE RELAXING: NOT AT ALL
1. FEELING NERVOUS, ANXIOUS, OR ON EDGE: NOT AT ALL
GAD7 TOTAL SCORE: 0
5. BEING SO RESTLESS THAT IT IS HARD TO SIT STILL: NOT AT ALL
3. WORRYING TOO MUCH ABOUT DIFFERENT THINGS: NOT AT ALL
IF YOU CHECKED OFF ANY PROBLEMS ON THIS QUESTIONNAIRE, HOW DIFFICULT HAVE THESE PROBLEMS MADE IT FOR YOU TO DO YOUR WORK, TAKE CARE OF THINGS AT HOME, OR GET ALONG WITH OTHER PEOPLE: NOT DIFFICULT AT ALL
2. NOT BEING ABLE TO STOP OR CONTROL WORRYING: NOT AT ALL
6. BECOMING EASILY ANNOYED OR IRRITABLE: NOT AT ALL
7. FEELING AFRAID AS IF SOMETHING AWFUL MIGHT HAPPEN: NOT AT ALL

## 2024-02-27 ASSESSMENT — ACTIVITIES OF DAILY LIVING (ADL)
BATHING: INDEPENDENT
PATIENT'S MEMORY ADEQUATE TO SAFELY COMPLETE DAILY ACTIVITIES?: YES
PILL BOX USED: YES
HEARING - LEFT EAR: FUNCTIONAL
USING TELEPHONE: INDEPENDENT
GROOMING: INDEPENDENT
GROCERY SHOPPING: INDEPENDENT
USING TRANSPORTATION: INDEPENDENT
FEEDING YOURSELF: INDEPENDENT
HEARING - RIGHT EAR: FUNCTIONAL
MANAGING FINANCES: INDEPENDENT
PREPARING MEALS: INDEPENDENT
TOILETING: INDEPENDENT
DRESSING YOURSELF: INDEPENDENT
ASSISTIVE_DEVICE: EYEGLASSES
TAKING MEDICATION: INDEPENDENT
JUDGMENT_ADEQUATE_SAFELY_COMPLETE_DAILY_ACTIVITIES: YES
ADEQUATE_TO_COMPLETE_ADL: YES
STIL DRIVING: NO
DOING HOUSEWORK: INDEPENDENT
WALKS IN HOME: INDEPENDENT
EATING: INDEPENDENT
NEEDS ASSISTANCE WITH FOOD: INDEPENDENT

## 2024-02-27 ASSESSMENT — PATIENT HEALTH QUESTIONNAIRE - PHQ9
2. FEELING DOWN, DEPRESSED OR HOPELESS: NOT AT ALL
SUM OF ALL RESPONSES TO PHQ9 QUESTIONS 1 AND 2: 0
1. LITTLE INTEREST OR PLEASURE IN DOING THINGS: NOT AT ALL

## 2024-02-27 ASSESSMENT — COLUMBIA-SUICIDE SEVERITY RATING SCALE - C-SSRS
6. HAVE YOU EVER DONE ANYTHING, STARTED TO DO ANYTHING, OR PREPARED TO DO ANYTHING TO END YOUR LIFE?: NO
2. HAVE YOU ACTUALLY HAD ANY THOUGHTS OF KILLING YOURSELF?: NO
1. IN THE PAST MONTH, HAVE YOU WISHED YOU WERE DEAD OR WISHED YOU COULD GO TO SLEEP AND NOT WAKE UP?: NO

## 2024-02-27 ASSESSMENT — LIFESTYLE VARIABLES
SKIP TO QUESTIONS 9-10: 1
AUDIT-C TOTAL SCORE: 1
HOW OFTEN DO YOU HAVE A DRINK CONTAINING ALCOHOL: MONTHLY OR LESS
HOW MANY STANDARD DRINKS CONTAINING ALCOHOL DO YOU HAVE ON A TYPICAL DAY: 1 OR 2
HOW OFTEN DO YOU HAVE SIX OR MORE DRINKS ON ONE OCCASION: NEVER

## 2024-02-27 ASSESSMENT — PAIN SCALES - GENERAL: PAINLEVEL: 0-NO PAIN

## 2024-02-27 NOTE — PATIENT INSTRUCTIONS
Start pravastatin 20 mg once per day in the evening.  Fasting blood test to check on this in 3 months. May 2024 is fine.  10-12 hour fast, ok to drink water.  Ct chest early fall 2024 as per Dr Small.    Change batteries in bp cuff every 3 months.  Monitor bp and pulse once per week, check it 3 times at the sitting.  Record and bring list and cuff to next visit.    Voltaren /diclofenac gel over the counter: 3-4 grams to left shoulder 2-3 times per day for 2 weeks. If shoulder is still an issue recommend a visit with an orthopedic surgeon.    Be sure you are on generic levothyroxine at for at least 6 weeks prior to the blood test end of may.  Follow up in June.

## 2024-03-05 DIAGNOSIS — I10 REACTIVE HYPERTENSION: ICD-10-CM

## 2024-03-05 DIAGNOSIS — E03.9 HYPOTHYROIDISM, UNSPECIFIED TYPE: ICD-10-CM

## 2024-03-05 NOTE — TELEPHONE ENCOUNTER
Pt called stating her prescriptions were not sent to [facility] at her last visit.    Metoprolol  Levothyroxine (She was previously taking Synthroid and it is no longer covered.)

## 2024-03-12 RX ORDER — METOPROLOL SUCCINATE 50 MG/1
75 TABLET, EXTENDED RELEASE ORAL DAILY
Qty: 150 TABLET | Refills: 3 | Status: SHIPPED | OUTPATIENT
Start: 2024-03-12

## 2024-03-12 RX ORDER — LEVOTHYROXINE SODIUM 88 UG/1
88 TABLET ORAL DAILY
Qty: 100 TABLET | Refills: 3 | Status: SHIPPED | OUTPATIENT
Start: 2024-03-12

## 2024-04-03 DIAGNOSIS — R93.89 ABNORMAL CT OF THE CHEST: Primary | ICD-10-CM

## 2024-06-13 ENCOUNTER — LAB (OUTPATIENT)
Dept: LAB | Facility: LAB | Age: 79
End: 2024-06-13
Payer: MEDICARE

## 2024-06-13 DIAGNOSIS — Z51.81 ENCOUNTER FOR MONITORING STATIN THERAPY: ICD-10-CM

## 2024-06-13 DIAGNOSIS — Z79.899 ENCOUNTER FOR MONITORING STATIN THERAPY: ICD-10-CM

## 2024-06-13 DIAGNOSIS — Z91.89 FRAMINGHAM CARDIAC RISK >20% IN NEXT 10 YEARS: ICD-10-CM

## 2024-06-13 DIAGNOSIS — Z11.59 ENCOUNTER FOR HEPATITIS C SCREENING TEST FOR LOW RISK PATIENT: ICD-10-CM

## 2024-06-13 DIAGNOSIS — R73.09 ELEVATED GLUCOSE: ICD-10-CM

## 2024-06-13 DIAGNOSIS — I25.10 CORONARY ARTERY CALCIFICATION: ICD-10-CM

## 2024-06-13 DIAGNOSIS — I25.84 CORONARY ARTERY CALCIFICATION: ICD-10-CM

## 2024-06-13 LAB
ALT SERPL W P-5'-P-CCNC: 16 U/L (ref 7–45)
AST SERPL W P-5'-P-CCNC: 23 U/L (ref 9–39)
CHOLEST SERPL-MCNC: 134 MG/DL (ref 0–199)
CHOLESTEROL/HDL RATIO: 2.4
CK SERPL-CCNC: 47 U/L (ref 0–215)
EST. AVERAGE GLUCOSE BLD GHB EST-MCNC: 111 MG/DL
GLUCOSE P FAST SERPL-MCNC: 83 MG/DL (ref 74–99)
HBA1C MFR BLD: 5.5 %
HCV AB SER QL: NONREACTIVE
HDLC SERPL-MCNC: 55 MG/DL
LDLC SERPL CALC-MCNC: 68 MG/DL
NON HDL CHOLESTEROL: 79 MG/DL (ref 0–149)
TRIGL SERPL-MCNC: 53 MG/DL (ref 0–149)
VLDL: 11 MG/DL (ref 0–40)

## 2024-06-13 PROCEDURE — 82550 ASSAY OF CK (CPK): CPT

## 2024-06-13 PROCEDURE — 83036 HEMOGLOBIN GLYCOSYLATED A1C: CPT

## 2024-06-13 PROCEDURE — 84450 TRANSFERASE (AST) (SGOT): CPT

## 2024-06-13 PROCEDURE — 84460 ALANINE AMINO (ALT) (SGPT): CPT

## 2024-06-13 PROCEDURE — 86803 HEPATITIS C AB TEST: CPT

## 2024-06-13 PROCEDURE — 82947 ASSAY GLUCOSE BLOOD QUANT: CPT

## 2024-06-13 PROCEDURE — 80061 LIPID PANEL: CPT

## 2024-06-13 PROCEDURE — 36415 COLL VENOUS BLD VENIPUNCTURE: CPT

## 2024-06-14 ENCOUNTER — TELEPHONE (OUTPATIENT)
Dept: PRIMARY CARE | Facility: CLINIC | Age: 79
End: 2024-06-14
Payer: MEDICARE

## 2024-06-14 NOTE — TELEPHONE ENCOUNTER
[place] called ao behalf of [employer]    She want to know if the prolia injection given on August 17, 2022 was supplied by    Specialty pharmacy  Free drug program  Office provided    [place] [phone]    #[id number]

## 2024-06-25 ENCOUNTER — APPOINTMENT (OUTPATIENT)
Dept: PRIMARY CARE | Facility: CLINIC | Age: 79
End: 2024-06-25
Payer: MEDICARE

## 2024-06-28 NOTE — PROGRESS NOTES
Patient ID: Mary Ibrahim is a 79 y.o. female who presents for Follow-up (4 mo follow up and review. Pt did bring a diary of BP. )    LAST VISIT PLAN FROM: 02/27/2024:  Mary was seen today for medicare annual wellness visit subsequent.  Diagnoses and all orders for this visit:  Encounter for subsequent annual wellness visit (AWV) in Medicare patient (Primary)  Annual visit for general adult medical examination with abnormal findings  Encounter for screening for other disorder  Full code status  Coronary artery calcification  -     pravastatin (Pravachol) 20 mg tablet; Take 1 tablet (20 mg) by mouth once daily.  -     Creatine Kinase; Future  -     Lipid Panel; Future  -     Aspartate Aminotransferase; Future  -     Alanine Aminotransferase; Future  [place] cardiac risk >20% in next 10 years  -     pravastatin (Pravachol) 20 mg tablet; Take 1 tablet (20 mg) by mouth once daily.  -     Creatine Kinase; Future  -     Lipid Panel; Future  -     Aspartate Aminotransferase; Future  -     Alanine Aminotransferase; Future  Cardiac risk counseling  Encounter for monitoring statin therapy  -     Cancel: Creatine Kinase; Future  -     Cancel: Lipid Panel; Future  -     Cancel: Aspartate Aminotransferase; Future  -     Cancel: Alanine Aminotransferase; Future  -     Creatine Kinase; Future  -     Lipid Panel; Future  -     Aspartate Aminotransferase; Future  -     Alanine Aminotransferase; Future  Elevated low density lipoprotein (LDL) cholesterol level  -     Cancel: Lipid Panel; Future  -     Cancel: Aspartate Aminotransferase; Future  -     Cancel: Alanine Aminotransferase; Future  Elevated glucose  -     Cancel: Hemoglobin A1c; Future  -     Cancel: Glucose, fasting; Future  -     Glucose, fasting; Future  -     Hemoglobin A1c; Future  Encounter for hepatitis C screening test for low risk patient  -     Cancel: Hepatitis C Antibody; Future  -     Hepatitis C Antibody; Future  Hypothyroidism due to Hashimoto's  thyroiditis  -     TSH with reflex to Free T4 if abnormal; Future  White coat syndrome with diagnosis of hypertension  Age related osteoporosis, unspecified pathological fracture presence  -     denosumab (Prolia) injection 60 mg  Granulomatous lung disease (CMS/HCC)  Comments:  lung changes on ct from covid being monitored by pulm med.  Other orders  -     Full code  Annual  history and physical completed including  ROS, PE.   Medicare wellness visit  completed including:  Immunizations,   Health Maintenance items,  Discussion of advanced directives and code status, which is: full code ok cpr, has hcpoa-prefers not to do lw.  Fall risk and prevention addressed.  Functionality and pain were assessed.   Cancer screening testing was addressed as appropriate-see patient discussion/orders.   Medications were discussed and reviewed/reconciled and refill need assessed/handled.   Patient states taking their medication regularly and appropriately.  Also:   Depression screening PhQ-2 completed: neg  Alcohol misuse screen: neg    In addition to the wellness visit and screening, the above medical issues were addressed:  As well as results of testing completed since last visit.    Bp is concerning, white coat htn can be something that needs further treatment, she will montior more closely at home and follow up.  Will avoid oral nsaids for shoulder, try topical.  Start pravastatin, her ascvd risk is 43%.  Risks benefits s/e discussed. Benefit outweighs risks.  Labs to monitor.    Osteoporosis, she is due for prolia injecton. Last labs were ok and within a reasonable time frame, nurse given th eok to proceed with prolia. Next dose in 6 months, dexa due oct nov 2025    75 min visit.  END LAST VISIT PLAN  -------------------------------------------  Scribe:  Patient is an 79-year-old female who presents today for follow up.    Hypertension:  BP today is WNL.  She brought with her some home BP readings of 112/46 (07/01/2024), 104/46  (06/21/2024), and 144/53 (06/11/2024).  Follow up on cardiovascular conditions:  Cardiac:    Chest pain?No   Palpitations? No   Increased mitchell?  No   Other:  Resp:    Shortness of breath?No   Wheezing No   Cough No   Other:  Extremities:    Leg or ankle swelling?No    Claudication?No   Other:  Neuro:   DizzinessNo   SyncopeNo    Blurred visionNo   New headaches No  Other:    Medications:   Current Outpatient Medications   Medication Instructions    calcium carbonate EX (TUMS EXTRA STRENGTH) 600 mg, oral, Daily, Chew and swallow 2 tablets once per day at lunch or supper    cholecalciferol (Vitamin D-3) 50 mcg (2,000 unit) capsule 1 capsule, oral, Daily    cycloSPORINE (Restasis) 0.05 % ophthalmic emulsion 1 drop, Both Eyes, Daily    esomeprazole (NEXIUM) 20 mg, oral, Daily before breakfast    estradiol (Estrace) 0.01 % (0.1 mg/gram) vaginal cream vaginal, 2 times weekly, Use a pea sized amount of cream at vaginal opening    levothyroxine (SYNTHROID, LEVOXYL) 88 mcg, oral, Daily, Take 1 tablet daily six days per week and none on Sundays    metoprolol succinate XL (TOPROL-XL) 75 mg, oral, Daily    Miebo, PF, 100 % drops     multivitamin-iron-minerals-folic acid (Centrum Silver) 0.4 mg-300 mcg- 250 mcg tablet 1 tablet, oral, Daily    peg 400-hypromellose-glycerin (Artificial Tears,pg-hypm-glyc,) 1-0.2-0.2 % drops ophthalmic (eye), As needed, Instill 1 or 2 drops in the affected eye(s)    pravastatin (PRAVACHOL) 20 mg, oral, Daily    Prolia 60 mg/mL syringe PROVIDER TO INJECT 60 MG (1 PREFILLED SYRINGE) BENEATH THE SKIN ONCE.    Tobradex ophthalmic ointment Apply 0.5 inches to left eye every other day. At night    urea (Carmol) 40 % cream 1 Application, Topical, Daily    white petrolatum-mineral oiL (Tears Naturale PM) 94-3 % ophthalmic ointment 1 Application, Left Eye, Every other day, Alternates with the tobradex at night       Taking them regularly.Yes   Side effects.No     Left Knee Pain:  She states that it does not  hinder her from doing anything, onset 1 year.  She reports if she is out walking and swelling starts to appear, it will affect her step.  If she continues to walk, it will resolve.  She also states the it will bother her, at times, while sitting at the computer.    Osteoporosis:  Patient reports that she may need prior authorization for her next Prolia injection.  I sent a message to the nursing staff to verify.  She is due for next Prolia injection on August 29,2024.  Order placed for non-fasting blood work to be completed 6 weeks prior to Prolia injection.    Health Maintenance:  She has a CT chest scheduled for 10/07/2024 and an OB/GYN appointment on 10/24/2024.  Orders placed today for mammogram and DEXA scan.    Vaccines:    We discussed vaccines.  Recommended  checking with CDC for Covid vaccine recommendations in Fall, 2024 along with influenza vaccine.    We reviewed and updated her medication list.    Repeat blood work ordered to be completed prior to next visit.    Follow up in 11/2024.    Hypertension  This is a chronic problem. The current episode started more than 1 year ago. The problem has been waxing and waning since onset. The problem is resistant. Associated symptoms include blurred vision. Pertinent negatives include no anxiety, chest pain, headaches, malaise/fatigue, neck pain, orthopnea, palpitations, peripheral edema, PND, shortness of breath or sweats. Agents associated with hypertension include thyroid hormones. Risk factors for coronary artery disease include family history. There are no compliance problems.      Review of Systems   Constitutional:  Negative for malaise/fatigue.   Eyes:  Positive for blurred vision.   Respiratory:  Negative for shortness of breath.    Cardiovascular:  Negative for chest pain, palpitations, orthopnea and PND.   Musculoskeletal:  Negative for neck pain.   Neurological:  Negative for headaches.     See ROS in HPI    Current Outpatient Medications   Medication  Instructions    calcium carbonate EX (TUMS EXTRA STRENGTH) 600 mg, oral, Daily, Chew and swallow 2 tablets once per day at lunch or supper    cholecalciferol (Vitamin D-3) 50 mcg (2,000 unit) capsule 1 capsule, oral, Daily    cycloSPORINE (Restasis) 0.05 % ophthalmic emulsion 1 drop, Both Eyes, Daily    esomeprazole (NEXIUM) 20 mg, oral, Daily before breakfast    estradiol (Estrace) 0.01 % (0.1 mg/gram) vaginal cream vaginal, 2 times weekly, Use a pea sized amount of cream at vaginal opening    levothyroxine (SYNTHROID, LEVOXYL) 88 mcg, oral, Daily, Take 1 tablet daily six days per week and none on Sundays    metoprolol succinate XL (TOPROL-XL) 75 mg, oral, Daily    Miebo, PF, 100 % drops     multivitamin-iron-minerals-folic acid (Centrum Silver) 0.4 mg-300 mcg- 250 mcg tablet 1 tablet, oral, Daily    peg 400-hypromellose-glycerin (Artificial Tears,pg-hypm-glyc,) 1-0.2-0.2 % drops ophthalmic (eye), As needed, Instill 1 or 2 drops in the affected eye(s)    pravastatin (PRAVACHOL) 20 mg, oral, Daily    Prolia 60 mg/mL syringe PROVIDER TO INJECT 60 MG (1 PREFILLED SYRINGE) BENEATH THE SKIN ONCE.    Tobradex ophthalmic ointment Apply 0.5 inches to left eye every other day. At night    urea (Carmol) 40 % cream 1 Application, Topical, Daily    white petrolatum-mineral oiL (Tears Naturale PM) 94-3 % ophthalmic ointment 1 Application, Left Eye, Every other day, Alternates with the tobradex at night     Allergies   Allergen Reactions    Sulfa (Sulfonamide Antibiotics) Other     Flu like symptoms     Objective  Results  reviewed:  Lab Results   Component Value Date    WBC 6.6 01/10/2024    HGB 13.8 01/10/2024    HCT 43.7 01/10/2024     01/10/2024    CHOL 134 06/13/2024    TRIG 53 06/13/2024    HDL 55.0 06/13/2024    ALT 16 06/13/2024    AST 23 06/13/2024     01/10/2024    K 4.0 01/10/2024     01/10/2024    CREATININE 0.79 01/10/2024    BUN 18 01/10/2024    CO2 33 (H) 01/10/2024    TSH 1.58 01/10/2024     "GLUF 83 06/13/2024    HGBA1C 5.5 06/13/2024     Physical ExamBP 136/54 (BP Location: Left arm, Patient Position: Sitting, BP Cuff Size: Adult)   Pulse 72   Resp 18   Ht 1.651 m (5' 5\")   Wt 63 kg (139 lb)   BMI 23.13 kg/m²       3/28/2023     1:46 PM 6/13/2023    11:33 AM 6/13/2023    12:41 PM 10/6/2023    10:45 AM 10/25/2023    10:44 AM 2/27/2024     9:57 AM 7/3/2024     9:59 AM   Vitals   Systolic 128 144 124 114 144 154 136   Diastolic 70 64 56 68 80 62 54   Heart Rate 88 64   62 62 72   Temp 36.5 °C (97.7 °F)   36.4 °C (97.5 °F) 35.9 °C (96.6 °F)     Resp  18   16  18   Height (in) 1.651 m (5' 5\") 1.651 m (5' 5\")  1.651 m (5' 5\") 1.676 m (5' 6\") 1.651 m (5' 5\") 1.651 m (5' 5\")   Weight (lb) 128.4 130  130.8 135.36 135.2 139   BMI 21.37 kg/m2 21.63 kg/m2  21.77 kg/m2 21.85 kg/m2 22.5 kg/m2 23.13 kg/m2   BSA (m2) 1.63 m2 1.64 m2  1.65 m2 1.69 m2 1.68 m2 1.7 m2   Visit Report  Report Report Report Report Report Report      Patient looks well and is in no obvious distress.  HEENT:   Normocephalic, no facial asymmetry  Eyes: Sclera and conjunctiva are clear.  Neck: No adenopathy cervical (Ant/post/lat), no Supraclavicular nodes.   Thyroid normal.  Carotid pulses normal, no carotid bruits.  Lungs : RR normal. Clear to auscultation anterior, posterior and lateral. No rales, wheezes rhonchi or rubs. Good air exchange.  Heart: RRR. No Murmur, gallop, click or rub.  Abdomen: Bowel sounds normal, No bruits. No pulsatile mass. No hepatosplenomegaly, masses or tenderness. Soft, no guarding.  Vascular:  Posterior tibialis and dorsalis pedis pulses within normal limits bilaterally.   Extremities: no upper extremity edema. No lower extremity edema.   Musculoskeletal: no synovitis of joints seen. No new deformity.  Left knee with mild anterior bursal swelling not tender, red or warm, c/w  mild bursitis.suspect this is what flares when it acts up. Suggest ice after activity, gentle nsaid use, ortho if worse. Or call.  Neuro: " CN 2-12 intact. Alert, appropriate.  Ambulates independently.  No gross motor deficit.   No tremors.  Psych: normal mood and affect.  Skin: No rash, bruising petechiae or jaundice.    Mary was seen today for follow-up.  Diagnoses and all orders for this visit:  Reactive hypertension (Primary)  Medication monitoring encounter  -     Blood Urea Nitrogen; Future  -     Calcium; Future  -     Creatinine; Future  Age-related osteoporosis without current pathological fracture  -     Blood Urea Nitrogen; Future  -     Calcium; Future  -     Creatinine; Future  -     XR DEXA bone density; Future  Menopause  -     XR DEXA bone density; Future  Chronic pain of left knee  Comments:  intermittent  Swelling of joint, knee, left  Visit for screening mammogram  -     BI mammo bilateral screening tomosynthesis; Future     Problem List Items Addressed This Visit       Osteoporosis    Relevant Orders    Blood Urea Nitrogen    Calcium    Creatinine    XR DEXA bone density    Reactive hypertension - Primary     Other Visit Diagnoses       Medication monitoring encounter        Relevant Orders    Blood Urea Nitrogen    Calcium    Creatinine    Menopause        Relevant Orders    XR DEXA bone density    Chronic pain of left knee        intermittent    Swelling of joint, knee, left        Visit for screening mammogram        Relevant Orders    BI mammo bilateral screening tomosynthesis        I am the primary care physician for this patient's ongoing medical care, which is managed during and in between office visits.  Overall she is doing reasonably well.  Observe re knee unless worsens, can ice after activity.  Eye issues are being managed by her eye specialist.    Patient Instructions:  Non fasting blood test due within 6 weeks prior to prolia August 29 appointment.    Mammogram due on/after October 3, 2024.  Bone density due October 20-29, 2025. No calcium or vitamin D supplements for 48 hours prior to this test.    November follow  up.  February annual wellness.    Flu shot October  Covid vaccine , new booster will be out September.  Scribe Attestation  By signing my name below, I, Beatriz Sykes   attest that this documentation has been prepared under the direction and in the presence of Mouna Esparza MD.

## 2024-06-30 ASSESSMENT — ENCOUNTER SYMPTOMS
SHORTNESS OF BREATH: 0
PALPITATIONS: 0
PND: 0
HEADACHES: 0
SWEATS: 0
BLURRED VISION: 1
HYPERTENSION: 1
ORTHOPNEA: 0
NECK PAIN: 0

## 2024-07-03 ENCOUNTER — APPOINTMENT (OUTPATIENT)
Dept: PRIMARY CARE | Facility: CLINIC | Age: 79
End: 2024-07-03
Payer: MEDICARE

## 2024-07-03 VITALS
HEIGHT: 65 IN | WEIGHT: 139 LBS | DIASTOLIC BLOOD PRESSURE: 54 MMHG | RESPIRATION RATE: 18 BRPM | BODY MASS INDEX: 23.16 KG/M2 | SYSTOLIC BLOOD PRESSURE: 136 MMHG | HEART RATE: 72 BPM

## 2024-07-03 DIAGNOSIS — Z51.81 MEDICATION MONITORING ENCOUNTER: ICD-10-CM

## 2024-07-03 DIAGNOSIS — M25.562 CHRONIC PAIN OF LEFT KNEE: ICD-10-CM

## 2024-07-03 DIAGNOSIS — M25.462 SWELLING OF JOINT, KNEE, LEFT: ICD-10-CM

## 2024-07-03 DIAGNOSIS — Z12.31 VISIT FOR SCREENING MAMMOGRAM: ICD-10-CM

## 2024-07-03 DIAGNOSIS — G89.29 CHRONIC PAIN OF LEFT KNEE: ICD-10-CM

## 2024-07-03 DIAGNOSIS — Z78.0 MENOPAUSE: ICD-10-CM

## 2024-07-03 DIAGNOSIS — M81.0 AGE-RELATED OSTEOPOROSIS WITHOUT CURRENT PATHOLOGICAL FRACTURE: ICD-10-CM

## 2024-07-03 DIAGNOSIS — I10 REACTIVE HYPERTENSION: Primary | ICD-10-CM

## 2024-07-03 PROCEDURE — 1159F MED LIST DOCD IN RCRD: CPT | Performed by: INTERNAL MEDICINE

## 2024-07-03 PROCEDURE — G2211 COMPLEX E/M VISIT ADD ON: HCPCS | Performed by: INTERNAL MEDICINE

## 2024-07-03 PROCEDURE — 1157F ADVNC CARE PLAN IN RCRD: CPT | Performed by: INTERNAL MEDICINE

## 2024-07-03 PROCEDURE — 99213 OFFICE O/P EST LOW 20 MIN: CPT | Performed by: INTERNAL MEDICINE

## 2024-07-03 PROCEDURE — 1158F ADVNC CARE PLAN TLK DOCD: CPT | Performed by: INTERNAL MEDICINE

## 2024-07-03 PROCEDURE — 1126F AMNT PAIN NOTED NONE PRSNT: CPT | Performed by: INTERNAL MEDICINE

## 2024-07-03 PROCEDURE — 1160F RVW MEDS BY RX/DR IN RCRD: CPT | Performed by: INTERNAL MEDICINE

## 2024-07-03 PROCEDURE — 3075F SYST BP GE 130 - 139MM HG: CPT | Performed by: INTERNAL MEDICINE

## 2024-07-03 PROCEDURE — 1123F ACP DISCUSS/DSCN MKR DOCD: CPT | Performed by: INTERNAL MEDICINE

## 2024-07-03 PROCEDURE — 3078F DIAST BP <80 MM HG: CPT | Performed by: INTERNAL MEDICINE

## 2024-07-03 RX ORDER — TOBRAMYCIN AND DEXAMETHASONE 3; 1 MG/G; MG/G
0.5 OINTMENT OPHTHALMIC EVERY OTHER DAY
COMMUNITY
Start: 2024-03-26

## 2024-07-03 RX ORDER — PERFLUOROHEXYLOCTANE 1 MG/MG
SOLUTION OPHTHALMIC
COMMUNITY
Start: 2024-06-20

## 2024-07-03 ASSESSMENT — PATIENT HEALTH QUESTIONNAIRE - PHQ9
1. LITTLE INTEREST OR PLEASURE IN DOING THINGS: NOT AT ALL
SUM OF ALL RESPONSES TO PHQ9 QUESTIONS 1 AND 2: 0
2. FEELING DOWN, DEPRESSED OR HOPELESS: NOT AT ALL

## 2024-07-03 ASSESSMENT — ENCOUNTER SYMPTOMS
NECK PAIN: 0
SWEATS: 0
HEADACHES: 0
PND: 0
PALPITATIONS: 0
ORTHOPNEA: 0
HYPERTENSION: 1
SHORTNESS OF BREATH: 0
BLURRED VISION: 1

## 2024-07-03 ASSESSMENT — PAIN SCALES - GENERAL: PAINLEVEL: 0-NO PAIN

## 2024-07-03 NOTE — PATIENT INSTRUCTIONS
Non fasting blood test due within 6 weeks prior to prolia August 29 appointment.    Mammogram due on/after October 3, 2024.  Bone density due October 20-29, 2025. No calcium or vitamin D supplements for 48 hours prior to this test.    November follow up.  February annual wellness.    Flu shot October  Covid vaccine , new booster will be out September.

## 2024-08-06 DIAGNOSIS — M81.0 AGE RELATED OSTEOPOROSIS, UNSPECIFIED PATHOLOGICAL FRACTURE PRESENCE: ICD-10-CM

## 2024-08-06 NOTE — TELEPHONE ENCOUNTER
Patient has an appointment on 24 for Prolia. Please send [facility] over to  [facility] PA  on 24. New PA will need to be done. Can wait until .

## 2024-08-07 ENCOUNTER — LAB (OUTPATIENT)
Dept: LAB | Facility: LAB | Age: 79
End: 2024-08-07
Payer: MEDICARE

## 2024-08-07 DIAGNOSIS — M81.0 AGE-RELATED OSTEOPOROSIS WITHOUT CURRENT PATHOLOGICAL FRACTURE: ICD-10-CM

## 2024-08-07 DIAGNOSIS — Z51.81 MEDICATION MONITORING ENCOUNTER: ICD-10-CM

## 2024-08-07 LAB
BUN SERPL-MCNC: 20 MG/DL (ref 6–23)
CALCIUM SERPL-MCNC: 9.7 MG/DL (ref 8.6–10.6)
CREAT SERPL-MCNC: 0.81 MG/DL (ref 0.5–1.05)
EGFRCR SERPLBLD CKD-EPI 2021: 73 ML/MIN/1.73M*2

## 2024-08-07 PROCEDURE — 84520 ASSAY OF UREA NITROGEN: CPT

## 2024-08-07 PROCEDURE — 82310 ASSAY OF CALCIUM: CPT

## 2024-08-07 PROCEDURE — 82565 ASSAY OF CREATININE: CPT

## 2024-08-07 PROCEDURE — 36415 COLL VENOUS BLD VENIPUNCTURE: CPT

## 2024-08-12 RX ORDER — DENOSUMAB 60 MG/ML
INJECTION SUBCUTANEOUS
Qty: 1 ML | Refills: 0 | Status: SHIPPED | OUTPATIENT
Start: 2024-08-12 | End: 2025-08-12

## 2024-08-13 PROCEDURE — RXMED WILLOW AMBULATORY MEDICATION CHARGE

## 2024-08-16 ENCOUNTER — PHARMACY VISIT (OUTPATIENT)
Dept: PHARMACY | Facility: CLINIC | Age: 79
End: 2024-08-16
Payer: MEDICARE

## 2024-08-16 ENCOUNTER — SPECIALTY PHARMACY (OUTPATIENT)
Dept: PHARMACY | Facility: CLINIC | Age: 79
End: 2024-08-16

## 2024-08-29 ENCOUNTER — APPOINTMENT (OUTPATIENT)
Dept: PRIMARY CARE | Facility: CLINIC | Age: 79
End: 2024-08-29
Payer: MEDICARE

## 2024-08-29 DIAGNOSIS — M81.0 AGE-RELATED OSTEOPOROSIS WITHOUT CURRENT PATHOLOGICAL FRACTURE: ICD-10-CM

## 2024-08-29 PROCEDURE — 96372 THER/PROPH/DIAG INJ SC/IM: CPT | Performed by: INTERNAL MEDICINE

## 2024-08-29 NOTE — PROGRESS NOTES
Mary is here for a Nurse visit to receive her Prolia Injection.    Labs work and Bone scan up to date.    Mary received her Prolia injection in her Right upper back arm, tolerated well.

## 2024-09-03 ENCOUNTER — TELEPHONE (OUTPATIENT)
Dept: OBSTETRICS AND GYNECOLOGY | Facility: CLINIC | Age: 79
End: 2024-09-03
Payer: MEDICARE

## 2024-09-03 DIAGNOSIS — N95.2 VAGINAL ATROPHY: Primary | ICD-10-CM

## 2024-09-03 RX ORDER — ESTRADIOL 0.1 MG/G
0.5 CREAM VAGINAL 2 TIMES WEEKLY
Qty: 42.5 G | Refills: 2 | Status: SHIPPED | OUTPATIENT
Start: 2024-09-03

## 2024-09-03 RX ORDER — ESTRADIOL 0.1 MG/G
CREAM VAGINAL
Qty: 42.5 G | Refills: 2 | Status: SHIPPED | OUTPATIENT
Start: 2024-09-03 | End: 2024-09-03 | Stop reason: ENTERED-IN-ERROR

## 2024-09-03 NOTE — TELEPHONE ENCOUNTER
Pt phoned requesting a refill of estradiol (Estrace) 0.01 % (0.1 mg/gram) vaginal cream be set to [facility] in [place].

## 2024-10-07 ENCOUNTER — HOSPITAL ENCOUNTER (OUTPATIENT)
Dept: RADIOLOGY | Facility: CLINIC | Age: 79
Discharge: HOME | End: 2024-10-07
Payer: MEDICARE

## 2024-10-07 VITALS — HEIGHT: 65 IN | BODY MASS INDEX: 23.14 KG/M2 | WEIGHT: 138.89 LBS

## 2024-10-07 DIAGNOSIS — Z12.31 VISIT FOR SCREENING MAMMOGRAM: ICD-10-CM

## 2024-10-07 DIAGNOSIS — R93.89 ABNORMAL CT OF THE CHEST: ICD-10-CM

## 2024-10-07 PROCEDURE — 71250 CT THORAX DX C-: CPT

## 2024-10-07 PROCEDURE — 77067 SCR MAMMO BI INCL CAD: CPT | Performed by: STUDENT IN AN ORGANIZED HEALTH CARE EDUCATION/TRAINING PROGRAM

## 2024-10-07 PROCEDURE — 77063 BREAST TOMOSYNTHESIS BI: CPT

## 2024-10-07 PROCEDURE — 77063 BREAST TOMOSYNTHESIS BI: CPT | Performed by: STUDENT IN AN ORGANIZED HEALTH CARE EDUCATION/TRAINING PROGRAM

## 2024-10-07 PROCEDURE — 71250 CT THORAX DX C-: CPT | Performed by: RADIOLOGY

## 2024-10-16 ENCOUNTER — APPOINTMENT (OUTPATIENT)
Dept: PULMONOLOGY | Facility: CLINIC | Age: 79
End: 2024-10-16
Payer: MEDICARE

## 2024-10-16 VITALS
HEIGHT: 65 IN | SYSTOLIC BLOOD PRESSURE: 163 MMHG | DIASTOLIC BLOOD PRESSURE: 70 MMHG | BODY MASS INDEX: 23.82 KG/M2 | HEART RATE: 65 BPM | WEIGHT: 143 LBS | TEMPERATURE: 97.8 F | OXYGEN SATURATION: 97 %

## 2024-10-16 DIAGNOSIS — R91.8 MULTIPLE LUNG NODULES ON CT: Primary | ICD-10-CM

## 2024-10-16 PROCEDURE — 3077F SYST BP >= 140 MM HG: CPT | Performed by: INTERNAL MEDICINE

## 2024-10-16 PROCEDURE — 1036F TOBACCO NON-USER: CPT | Performed by: INTERNAL MEDICINE

## 2024-10-16 PROCEDURE — 1157F ADVNC CARE PLAN IN RCRD: CPT | Performed by: INTERNAL MEDICINE

## 2024-10-16 PROCEDURE — 3078F DIAST BP <80 MM HG: CPT | Performed by: INTERNAL MEDICINE

## 2024-10-16 PROCEDURE — 99214 OFFICE O/P EST MOD 30 MIN: CPT | Performed by: INTERNAL MEDICINE

## 2024-10-16 PROCEDURE — G2211 COMPLEX E/M VISIT ADD ON: HCPCS | Performed by: INTERNAL MEDICINE

## 2024-10-16 PROCEDURE — 1159F MED LIST DOCD IN RCRD: CPT | Performed by: INTERNAL MEDICINE

## 2024-10-23 ENCOUNTER — APPOINTMENT (OUTPATIENT)
Dept: OBSTETRICS AND GYNECOLOGY | Facility: CLINIC | Age: 79
End: 2024-10-23
Payer: MEDICARE

## 2024-10-24 ENCOUNTER — APPOINTMENT (OUTPATIENT)
Dept: OBSTETRICS AND GYNECOLOGY | Facility: CLINIC | Age: 79
End: 2024-10-24
Payer: MEDICARE

## 2024-10-24 VITALS
DIASTOLIC BLOOD PRESSURE: 75 MMHG | RESPIRATION RATE: 20 BRPM | HEART RATE: 70 BPM | SYSTOLIC BLOOD PRESSURE: 166 MMHG | WEIGHT: 144.6 LBS | BODY MASS INDEX: 24.06 KG/M2

## 2024-10-24 DIAGNOSIS — L30.9 VULVAR DERMATITIS: Primary | ICD-10-CM

## 2024-10-24 PROCEDURE — 3077F SYST BP >= 140 MM HG: CPT | Performed by: STUDENT IN AN ORGANIZED HEALTH CARE EDUCATION/TRAINING PROGRAM

## 2024-10-24 PROCEDURE — 1157F ADVNC CARE PLAN IN RCRD: CPT | Performed by: STUDENT IN AN ORGANIZED HEALTH CARE EDUCATION/TRAINING PROGRAM

## 2024-10-24 PROCEDURE — 1126F AMNT PAIN NOTED NONE PRSNT: CPT | Performed by: STUDENT IN AN ORGANIZED HEALTH CARE EDUCATION/TRAINING PROGRAM

## 2024-10-24 PROCEDURE — 3078F DIAST BP <80 MM HG: CPT | Performed by: STUDENT IN AN ORGANIZED HEALTH CARE EDUCATION/TRAINING PROGRAM

## 2024-10-24 PROCEDURE — G2211 COMPLEX E/M VISIT ADD ON: HCPCS | Performed by: STUDENT IN AN ORGANIZED HEALTH CARE EDUCATION/TRAINING PROGRAM

## 2024-10-24 PROCEDURE — 99214 OFFICE O/P EST MOD 30 MIN: CPT | Performed by: STUDENT IN AN ORGANIZED HEALTH CARE EDUCATION/TRAINING PROGRAM

## 2024-10-24 PROCEDURE — 1159F MED LIST DOCD IN RCRD: CPT | Performed by: STUDENT IN AN ORGANIZED HEALTH CARE EDUCATION/TRAINING PROGRAM

## 2024-10-24 RX ORDER — TRIAMCINOLONE ACETONIDE 1 MG/G
OINTMENT TOPICAL DAILY
Qty: 30 G | Refills: 2 | Status: SHIPPED | OUTPATIENT
Start: 2024-10-24

## 2024-10-24 ASSESSMENT — PAIN SCALES - GENERAL: PAINLEVEL_OUTOF10: 0-NO PAIN

## 2024-10-24 NOTE — PROGRESS NOTES
HISTORY OF PRESENT ILLNESS:  Mary Ibrahim is a 79 y.o. female, who presents in follow up for POP, OAB    During last encounter on 10/25/23, reviewed and agreed to the following:     POP  - continue pessary maintenance at home  - yearly pelvic exam     Vaginal atrophy  - continue estrogen as above, does not need refill today     OAB  - declines management at this time  - reviewed could try medication in future if she desires  - will monitor    Today she reports   No issues with pessary at home. Takes out once per week for cleaning. Sometimes feels she needs to take it out for a bowel movement.    The following were reviewed to gain additional history:  External notes: Dr. Esparza note 7/3/24, multiple issues including elevated LDL, hypothyroid, white coat HTN, granulomatous lung disease (monitored by pulmonology)  Test results: A1c 5.5% 6/13/24          PHYSICAL EXAMINATION:  No LMP recorded. Patient is postmenopausal.  Body mass index is 24.06 kg/m².  /75   Pulse 70   Resp 20   Wt 65.6 kg (144 lb 9.6 oz)   BMI 24.06 kg/m²     General Appearance: well appearing  Neuro: Alert and oriented   HEENT: mucous membranes moist, neck supple  Resp: No respiratory distress, normal work of breathing  MSK: normal range of motion, gait appropriate    Pelvic:  Chaperone for pelvic exam:   Genitourinary:  erythema surrounding vulvar skin with hyperkeratosis appearance, [place]th[place]'s glands, [place]'s glands negative,   Urethra normal meatus, non-tender, no periurethral mass  Vaginal mucosa  normal  Cervix  normal  Uterus normal size, nontender  Adnexae  negative nontender, no masses  Atrophy positive    CST negative  Pelvic floor muscle contraction  /5    POP-Q (in supine position):       Aa 0     Ba 0     C -5              gh 4     pb 3     tvl 9              Ap 0     Bp 0     D -6    Rectal: no hemorrhoids, fissures or masses.    PVR (by ultrasound): n/a      IMPRESSION AND PLAN:  Mary Ibrahim is a 79 y.o.  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  The patient expressed understanding and agrees with the plan.     Gail Juarez MD

## 2024-10-24 NOTE — PATIENT INSTRUCTIONS
Steroid cream is called triamcinolone  Start using this once daily on the skin outside the vagina  Monitor symptoms and as things improve, decrease slowly and taper down. Ok to use this as needed going forward.      SOME SUGGESTED VULVAR PAIN & ITCHING MEASURES    The vulva is the external genitalia in the female. The skin of the vulva can be quite sensitive. Because it is moist and frequently subjected to friction while sitting and moving, this area can be easily injured. There are various strategies that can be used to prevent irritation and allow the vulva to heal. Keeping this area dry can accelerate healing.     Avoiding contact with potential irritants that contain chemicals is important. Some common irritants include:   - Adult or baby wipes  - Antiseptics (iodine, hexachlorophene)   - Body fluids (semen or saliva)   - Colored or scented toilet paper  - Condoms (lubricant or spermicide containing)   - Contraceptive creams, jellies, foams, nonoxynol-9   - Personal lubricants   - Dyes (eg, chemically treated clothing, hygiene products, perfume). Avoid tight fitted clothing.  - Synthetic nylon underwear and tight clothing or undergarments   - Emollients (eg, lanolin, jojoba oil, glycerin)   - Laundry detergents, fabric softeners, and dryer sheets   - Shaving cream and shaving (in general)   - Rubber products (including latex)   - Sanitary products, including tampons and pads   - Soaps, bubble bath, bath salts, shampoos, and conditioners   - Tea tree oil   - Topical agents: anesthetics (eg, benzocaine, lidocaine, dibucaine),  antibacterials (eg, neomycin, bacitracin, polymyxin), antimycotics (eg, imidazoles, nystatin), corticosteroids, medications, including trichloroacetic acid, 5-fluorouracil, podofilox, or podophyllin  - Vaginal hygiene products, including vaginal spray    The following recommendations are specific measures that can help minimize vulvar irritation:  -  Wear white 100% cotton underwear and do  not wear underwear at night. Do not wear pantyhose, tights, or other close-fitting clothes. Enclosing this area with  synthetic fibers holds both heat and moisture in the skin, conditions which potentiate the development of infections. Tight-fitting clothes may also increase your symptoms of discomfort. After washing underwear, put it through at least one whole cycle with water only. Some women have suffered needlessly from irritants in detergents whose residue was left in clothes by incomplete rinsing. Rinsing clothes thoroughly is more important than which detergent is used although to be on the safe side, the milder the soap, the better. Wash new underwear before wearing. Fabric softeners and dryer sheets should not be used.  -  Rinse skin off with plain water frequently. Use tap water, distilled water, sitz baths, squirt bottles, or bidets. Additionally, hand held showers can be helpful for rinsing the vulva. After rinsing, pat the skin gently dry.  -  Use very mild soap for bathing. It is best not to use any soaps on the vulva. The vulva should be rinsed with warm water. Unscented soaps or soaps for sensitive skin are best to use on the body. Special soap products can be found at pharmacies or health food stores. Remember that frequent baths with soaps may increase the irritation.  -  Consider using 100% cotton menstrual pads and tampons. Many women with vulvar pain experience a significant increase in irritation and pain every month when they use commercial paper pads or tampons. This monthly increase in pain can often be reduced by using 100% washable and reusable cotton menstrual pads. Pure cotton tampons are available. If using a commercial pad, consider cutting white flannel squares and placing the square between the vulva and the pad to minimize direct contact between the vulva and the irritating pad. You could also consider applying Vaseline or Aquaphor to the vulva before putting on a pad to act as a  barrier ointment.  -  Don't sit or remain in a wet bathing suit for prolonged periods.  -  Additionally, it is often recommended that the vulva is left uncovered at night (i.e. no underwear) to allow adequate exposure

## 2024-11-03 NOTE — PROGRESS NOTES
Patient ID: Mary Ibrahim is a 79 y.o. female who presents for Follow-up (Pt here for follow up and review. Pt is getting a covid shot in a couple weeks. She had covid in August. There is also an alert for IPV vaccine? Pt also has some pain in her left wrist, which she went to Urgent care for and they told her it was gouty arthritis, which she took meds for, but she has noticed that she has to wear a splint at night to make this feel better)  LAST VISIT PLAN FROM: 07/03/2024  Overall she is doing reasonably well.  Observe re knee unless worsens, can ice after activity.  Eye issues are being managed by her eye specialist.  Patient Instructions:  Non fasting blood test due within 6 weeks prior to prolia August 29 appointment.  Mammogram due on/after October 3, 2024.  Bone density due October 20-29, 2025. No calcium or vitamin D supplements for 48 hours prior to this test.  November follow up.  February annual wellness.  Flu shot October  Covid vaccine , new booster will be out September.  END LAST VISIT PLAN  -------------------------------------------  Hypertension  This is a recurrent problem. The current episode started more than 1 year ago. Associated symptoms include blurred vision and neck pain. Pertinent negatives include no anxiety, chest pain, headaches, malaise/fatigue, orthopnea, palpitations, peripheral edema, PND, shortness of breath or sweats. Agents associated with hypertension include no associated agents, NSAIDs and thyroid hormones. There are no associated agents, NSAIDs and thyroid hormones to hypertension. Risk factors for coronary artery disease include family history and post-menopausal state. There are no compliance problems.      Patient is an 79-year-old female who presents for follow up.    Since last visit, patient underwent mammogram and CT Chest on 10/07/2024 (See Objective).  We discussed her results.  Will repeat CT chest in 1 year.  Dr. Small will order 6 months prior.    Neck  Pain:  Patient reports neck pain when she turns her head from side to side.  She states that she will take Tylenol or ibuprofen if it is more bothersome. She states she can move her head up and down but experiences discomfort when turning from side to side, onset a month or so.  Order placed for referral to PT.    Hypertension:  BP is elevated today at 140/62.  Recheck by me several times was in the 160's/70. She has had elevated BP at Dr. Small's office and at [facility].  Last normal BP in office was 10/2023.  She brought her BP cuff with her today and she got 175/63 on her monitor, so, her monitor is accurate.  Her at-home readings were 127/50 and 117/47.  We discussed white coat syndrome.  She reports taking her metoprolol at bedtime.  Recommended taking her BP in the morning on the day of her appointment, and if elevated, take an extra half of metoprolol in the AM.  If it is not elevated, do not take extra metoprolol.  Follow up on cardiovascular conditions:  Cardiac:   Chest pain?No  Palpitations? No  Increased mitchell?  No  Other:  Resp:   Shortness of breath?No  Wheezing No  Cough No  Other:  Extremities:   Leg or ankle swelling?No   Claudication?No  Other:  Neuro:  DizzinessNo  SyncopeNo   Blurred visionNo  New headaches No  Other:  Medications:   Current Outpatient Medications   Medication Instructions    calcium carbonate (TUMS EXTRA STRENGTH) 600 mg, Daily    cholecalciferol (Vitamin D-3) 50 mcg (2,000 unit) capsule 1 capsule, Daily    cycloSPORINE (Restasis) 0.05 % ophthalmic emulsion 1 drop, Daily    esomeprazole (NEXIUM) 20 mg, Daily before breakfast    estradiol (ESTRACE) 0.5 g, vaginal, 2 times weekly, Use a pea sized amount in the vagina twice weekly at bedtime    levothyroxine (SYNTHROID, LEVOXYL) 88 mcg, oral, Daily, Take 1 tablet daily six days per week and none on Sundays    metoprolol succinate XL (TOPROL-XL) 75 mg, oral, Daily    Miebo, PF, 100 % drops     multivitamin-iron-minerals-folic acid  (Centrum Silver) 0.4 mg-300 mcg- 250 mcg tablet 1 tablet, Daily    peg 400-hypromellose-glycerin (Artificial Tears,pg-hypm-glyc,) 1-0.2-0.2 % drops As needed    pravastatin (PRAVACHOL) 20 mg, oral, Daily    Prolia 60 mg/mL syringe PROVIDER TO INJECT 60 MG (1 PREFILLED SYRINGE) BENEATH THE SKIN ONCE.    Tobradex ophthalmic ointment Apply 0.5 inches to left eye every other day. At night    triamcinolone (Kenalog) 0.1 % ointment Topical, Daily    urea (Carmol) 40 % cream 1 Application, Daily    white petrolatum-mineral oiL (Tears Naturale PM) 94-3 % ophthalmic ointment 1 Application, Every other day      Taking them regularly.Yes  Side effects.No    Osteoporosis:  Patient is receiving Prolia 60 mg injections every 6 months; no reported side effects.    Social History:  Patient is planning a trip to [facility] in March.    We discussed recommended vaccines including Covid booster and influenza vaccine.  Patient received her influenza vaccine 10/30/2024. She plans on getting the Covid booster in a couple of weeks.  UTD on all other vaccines.    Orders placed for fasting blood work as required along with UA due in 02/2025.    Follow up 02/12/2025.    Review of Systems   Constitutional:  Negative for malaise/fatigue.   Eyes:  Positive for blurred vision.   Respiratory:  Negative for shortness of breath.    Cardiovascular:  Negative for chest pain, palpitations, orthopnea and PND.   Musculoskeletal:  Positive for neck pain.   Neurological:  Negative for headaches.     See ROS in HPI    Current Outpatient Medications   Medication Instructions    calcium carbonate (TUMS EXTRA STRENGTH) 600 mg, Daily    cholecalciferol (Vitamin D-3) 50 mcg (2,000 unit) capsule 1 capsule, Daily    cycloSPORINE (Restasis) 0.05 % ophthalmic emulsion 1 drop, Daily    esomeprazole (NEXIUM) 20 mg, Daily before breakfast    estradiol (ESTRACE) 0.5 g, vaginal, 2 times weekly, Use a pea sized amount in the vagina twice weekly at bedtime    levothyroxine  (SYNTHROID, LEVOXYL) 88 mcg, oral, Daily, Take 1 tablet daily six days per week and none on Sundays    metoprolol succinate XL (TOPROL-XL) 75 mg, oral, Daily    Miebo, PF, 100 % drops     multivitamin-iron-minerals-folic acid (Centrum Silver) 0.4 mg-300 mcg- 250 mcg tablet 1 tablet, Daily    peg 400-hypromellose-glycerin (Artificial Tears,pg-hypm-glyc,) 1-0.2-0.2 % drops As needed    pravastatin (PRAVACHOL) 20 mg, oral, Daily    Prolia 60 mg/mL syringe PROVIDER TO INJECT 60 MG (1 PREFILLED SYRINGE) BENEATH THE SKIN ONCE.    Tobradex ophthalmic ointment Apply 0.5 inches to left eye every other day. At night    triamcinolone (Kenalog) 0.1 % ointment Topical, Daily    urea (Carmol) 40 % cream 1 Application, Daily    white petrolatum-mineral oiL (Tears Naturale PM) 94-3 % ophthalmic ointment 1 Application, Every other day     Allergies   Allergen Reactions    Sulfa (Sulfonamide Antibiotics) Other     Flu like symptoms     Objective    Results reviewed:   Mammogram 10/07/2024:  IMPRESSION:  No mammographic evidence of malignancy.  BI-RADS CATEGORY:  BI-RADS Category:  1 Negative.  Recommendation:  Annual Screening.  Recommended Date:  1 Year.  Laterality:  Bilateral.  For any future breast imaging appointments, please call [phone]  [phone]).  MACRO:  None  Signed by: Kaylynn Tse 10/11/2024 2:30 PM  Dictation workstation:   [id number]    CT Chest 10/07/2024:  IMPRESSION:  1.  Redemonstration multiple nodules in the lungs bilaterally from to  the right upper lobe, unchanged in size and number from the prior.  Recommend follow-up are suggested in 12 months.  2. Redemonstration of centrilobular tree-in-bud opacities in the  upper lobes bilaterally, similar to the prior, suggestive of  underlying inflammatory process.  MACRO:  None  Signed by: Mariusz Hoover 10/9/2024 6:41 AM  Dictation workstation:   [id number]    Latest Complete Lab Results:  CBC:   Lab Results   Component Value Date    WBC 6.6 01/10/2024    RBC  "4.65 01/10/2024    HGB 13.8 01/10/2024    HCT 43.7 01/10/2024    MCV 94 01/10/2024    MCH 29.7 01/10/2024    MCHC 31.6 (L) 01/10/2024     01/10/2024     CMP:  Lab Results   Component Value Date    GLUCOSE 90 01/10/2024     01/10/2024    K 4.0 01/10/2024     01/10/2024    CO2 33 (H) 01/10/2024    ANIONGAP 9 (L) 01/10/2024    BUN 20 08/07/2024    CREATININE 0.81 08/07/2024    GFRF 79 08/15/2023    CALCIUM 9.7 08/07/2024    ALBUMIN 4.0 01/10/2024    ALKPHOS 65 01/10/2024    PROT 6.8 01/10/2024    AST 23 06/13/2024    BILITOT 0.6 01/10/2024    ALT 16 06/13/2024      Lipid:   Lab Results   Component Value Date    CHOL 134 06/13/2024    HDL 55.0 06/13/2024    CHHDL 2.4 06/13/2024    LDLF 75 04/21/2022    VLDL 11 06/13/2024    TRIG 53 06/13/2024     LDL Direct:  No results found for: \"LDLDIRECT\"     TSH:   Lab Results   Component Value Date    TSH 1.58 01/10/2024     B12:  Lab Results   Component Value Date    RGUSNNHD85 467 01/10/2024     Vitamin D:  Lab Results   Component Value Date    VITD25 61 01/10/2024      HgA1c:   Lab Results   Component Value Date    HGBA1C 5.5 06/13/2024    YVDUUGAM1Q 111 06/13/2024     Physical Exam  Vitals:      10/25/2023    10:44 AM 2/27/2024     9:57 AM 7/3/2024     9:59 AM 10/7/2024     1:47 PM 10/16/2024    11:31 AM 10/24/2024    11:00 AM 11/12/2024     2:11 PM   Vitals   Systolic 144 154 136  163 166 142   Diastolic 80 62 54  70 75 62   Heart Rate 62 62 72  65 70 72   Temp 35.9 °C (96.6 °F)    36.6 °C (97.8 °F)     Resp 16  18   20 18   Height (in) 1.676 m (5' 6\") 1.651 m (5' 5\") 1.651 m (5' 5\") 1.651 m (5' 5\") 1.651 m (5' 5\")  1.651 m (5' 5\")   Weight (lb) 135.36 135.2 139 138.89 143 144.6 142   BMI 21.85 kg/m2 22.5 kg/m2 23.13 kg/m2 23.11 kg/m2 23.8 kg/m2 24.06 kg/m2 23.63 kg/m2   BSA (m2) 1.69 m2 1.68 m2 1.7 m2 1.7 m2 1.73 m2 1.73 m2 1.72 m2     Patient looks well and is in no obvious distress.  HEENT:   Normocephalic, no facial asymmetry  Eyes: Sclera and " conjunctiva are clear.  Neck: No adenopathy cervical (Ant/post/lat), no Supraclavicular nodes.   Thyroid normal.  Carotid pulses normal, no carotid bruits.  Lungs : RR normal. Clear to auscultation anterior, posterior and lateral. No rales, wheezes rhonchi or rubs. Good air exchange.  Heart: RRR. No Murmur, gallop, click or rub.  Abdomen: Bowel sounds normal, No bruits. No pulsatile mass. No hepatosplenomegaly, masses or tenderness. Soft, no guarding.  Vascular:  Posterior tibialis pulses within normal limits, bilaterally.   Extremities: No upper extremity edema. No lower extremity edema.   Musculoskeletal: No synovitis of joints seen. No new deformity.  Trapezius muscles spasm, bilaterally.  Order placed for referral to PT.  Neuro: CN 2-12 intact. Alert, appropriate.  Ambulates independently.  No gross motor deficit.   No tremors.  Psych: normal mood and affect.  Skin: No rash, bruising petechiae or jaundice.    Mary was seen today for follow-up.  Diagnoses and all orders for this visit:  Immunization counseling (Primary)  Trapezius muscle spasm  -     Referral to Physical Therapy; Future  Decreased range of motion of neck  -     Referral to Physical Therapy; Future  Abnormal TSH  Hypothyroidism, unspecified type  -     Comprehensive Metabolic Panel; Future  -     TSH with reflex to Free T4 if abnormal; Future  -     Vitamin B12; Future  Reactive hypertension  -     CBC and Auto Differential; Future  -     Albumin-Creatinine Ratio, Urine Random; Future  Age-related osteoporosis without current pathological fracture  Vitamin D deficiency  -     Vitamin D 25-Hydroxy,Total (for eval of Vitamin D levels); Future  Trimont cardiac risk >20% in next 10 years  Coronary artery calcification  -     CBC and Auto Differential; Future  -     Comprehensive Metabolic Panel; Future  -     Cholesterol, LDL Direct; Future  Encounter for monitoring statin therapy  -     Cholesterol, LDL Direct; Future  -     CK; Future  -      Lipid panel; Future       See patient instructions in wrap up plan, orders and comments for treatment plan.  I am the Internal Medicine physician providing ongoing chronic medical care for this patient, which is managed during and in between office visits.    Patient Instructions:  CT chest and mammogram due in one year. Dr Small will order the CT scan.    Fasting blood test and urine albumin test in February before your February appt.  Instructions for obtaining lab tests:   You do not need a paper requisition when obtaining tests at a  facility. No appointment is needed for lab tests.  For fasting blood tests:  Please do not consume calories for 10-12 hours prior to this blood test. It is ok to drink water, you should be hydrated.  Please do not take vitamins, supplements or thyroid medication before your blood is drawn this day.   Most lab results should be available for your review on the  My Chart portal within 48 hours. Please contact the office if you have not received results within 2 weeks of obtaining the test.    See you in February.  Sooner if needed.    Thanks for bringing your bp cuff and please bring to every appt.  Check bp the am of your next appt, at home. If bp is going up and is at 150 systolic or higher, take an extra 1/2 tab of metoprolol.  Scribe Attestation  By signing my name below, IIrma Scribe   attest that this documentation has been prepared under the direction and in the presence of Mouna Esparza MD.

## 2024-11-11 ASSESSMENT — ENCOUNTER SYMPTOMS
BLURRED VISION: 1
PND: 0
SHORTNESS OF BREATH: 0
HYPERTENSION: 1
NECK PAIN: 1
SWEATS: 0
ORTHOPNEA: 0
PALPITATIONS: 0
HEADACHES: 0

## 2024-11-12 ENCOUNTER — APPOINTMENT (OUTPATIENT)
Dept: PRIMARY CARE | Facility: CLINIC | Age: 79
End: 2024-11-12
Payer: MEDICARE

## 2024-11-12 VITALS
WEIGHT: 142 LBS | RESPIRATION RATE: 18 BRPM | HEIGHT: 65 IN | BODY MASS INDEX: 23.66 KG/M2 | SYSTOLIC BLOOD PRESSURE: 142 MMHG | HEART RATE: 72 BPM | DIASTOLIC BLOOD PRESSURE: 62 MMHG

## 2024-11-12 DIAGNOSIS — R79.89 ABNORMAL TSH: ICD-10-CM

## 2024-11-12 DIAGNOSIS — Z51.81 ENCOUNTER FOR MONITORING STATIN THERAPY: ICD-10-CM

## 2024-11-12 DIAGNOSIS — I10 REACTIVE HYPERTENSION: ICD-10-CM

## 2024-11-12 DIAGNOSIS — Z79.899 ENCOUNTER FOR MONITORING STATIN THERAPY: ICD-10-CM

## 2024-11-12 DIAGNOSIS — Z71.85 IMMUNIZATION COUNSELING: Primary | ICD-10-CM

## 2024-11-12 DIAGNOSIS — M62.838 TRAPEZIUS MUSCLE SPASM: ICD-10-CM

## 2024-11-12 DIAGNOSIS — Z91.89 FRAMINGHAM CARDIAC RISK >20% IN NEXT 10 YEARS: ICD-10-CM

## 2024-11-12 DIAGNOSIS — M81.0 AGE-RELATED OSTEOPOROSIS WITHOUT CURRENT PATHOLOGICAL FRACTURE: ICD-10-CM

## 2024-11-12 DIAGNOSIS — E55.9 VITAMIN D DEFICIENCY: ICD-10-CM

## 2024-11-12 DIAGNOSIS — E03.9 HYPOTHYROIDISM, UNSPECIFIED TYPE: ICD-10-CM

## 2024-11-12 DIAGNOSIS — I25.10 CORONARY ARTERY CALCIFICATION: ICD-10-CM

## 2024-11-12 DIAGNOSIS — R29.898 DECREASED RANGE OF MOTION OF NECK: ICD-10-CM

## 2024-11-12 PROCEDURE — 3077F SYST BP >= 140 MM HG: CPT | Performed by: INTERNAL MEDICINE

## 2024-11-12 PROCEDURE — 1157F ADVNC CARE PLAN IN RCRD: CPT | Performed by: INTERNAL MEDICINE

## 2024-11-12 PROCEDURE — 1159F MED LIST DOCD IN RCRD: CPT | Performed by: INTERNAL MEDICINE

## 2024-11-12 PROCEDURE — G2211 COMPLEX E/M VISIT ADD ON: HCPCS | Performed by: INTERNAL MEDICINE

## 2024-11-12 PROCEDURE — 1126F AMNT PAIN NOTED NONE PRSNT: CPT | Performed by: INTERNAL MEDICINE

## 2024-11-12 PROCEDURE — 99214 OFFICE O/P EST MOD 30 MIN: CPT | Performed by: INTERNAL MEDICINE

## 2024-11-12 PROCEDURE — 1123F ACP DISCUSS/DSCN MKR DOCD: CPT | Performed by: INTERNAL MEDICINE

## 2024-11-12 PROCEDURE — 3078F DIAST BP <80 MM HG: CPT | Performed by: INTERNAL MEDICINE

## 2024-11-12 PROCEDURE — 1158F ADVNC CARE PLAN TLK DOCD: CPT | Performed by: INTERNAL MEDICINE

## 2024-11-12 ASSESSMENT — ENCOUNTER SYMPTOMS
ORTHOPNEA: 0
SWEATS: 0
PALPITATIONS: 0
BLURRED VISION: 1
SHORTNESS OF BREATH: 0
NECK PAIN: 1
PND: 0
HYPERTENSION: 1
HEADACHES: 0

## 2024-11-12 ASSESSMENT — PAIN SCALES - GENERAL: PAINLEVEL_OUTOF10: 0-NO PAIN

## 2024-11-12 NOTE — PATIENT INSTRUCTIONS
CT chest and mammogram due in one year. Dr Small will order the CT scan.    Fasting blood test and urine albumin test in February before your February appt.  Instructions for obtaining lab tests:   You do not need a paper requisition when obtaining tests at a  facility. No appointment is needed for lab tests.  For fasting blood tests:  Please do not consume calories for 10-12 hours prior to this blood test. It is ok to drink water, you should be hydrated.  Please do not take vitamins, supplements or thyroid medication before your blood is drawn this day.   Most lab results should be available for your review on the  My Chart portal within 48 hours. Please contact the office if you have not received results within 2 weeks of obtaining the test.    See you in February.  Sooner if needed.    Thanks for bringing your bp cuff and please bring to every appt.  Check bp the am of your next appt, at home. If bp is going up and is at 150 systolic or higher, take an extra 1/2 tab of metoprolol.

## 2025-01-02 ENCOUNTER — TELEPHONE (OUTPATIENT)
Dept: PRIMARY CARE | Facility: CLINIC | Age: 80
End: 2025-01-02
Payer: MEDICARE

## 2025-01-02 NOTE — TELEPHONE ENCOUNTER
Mary from [facility] insurance calling in about DOS 8/29/24, they need to know if Prolia was provided by pt or pharmacy or us? I can't tell?? The reference ID is [id number]

## 2025-01-02 NOTE — TELEPHONE ENCOUNTER
[employer] is not patient's insurance company and we do not have a release on file to release any medical information to [employer]. Please call patient and verify if she has switched insurances.

## 2025-02-04 ENCOUNTER — TELEPHONE (OUTPATIENT)
Dept: PRIMARY CARE | Facility: CLINIC | Age: 80
End: 2025-02-04
Payer: MEDICARE

## 2025-02-04 NOTE — PROGRESS NOTES
Subjective   Reason for Visit: Mary Ibrahim is an 79 y.o. female here for a Medicare Wellness Visit, Annual Physical, and follow up of conditions.   Past Medical, Surgical, and Family History reviewed and updated in chart.  Reviewed all medications by prescribing practitioner or clinical pharmacist (such as prescriptions, OTCs, herbal therapies and supplements) and documented in the medical record.  LAST VISIT PLAN FROM: 11/12/2024:  Patient Instructions:  CT chest and mammogram due in one year. Dr Small will order the CT scan.  Fasting blood test and urine albumin test in February before your February appt.  Instructions for obtaining lab tests:   You do not need a paper requisition when obtaining tests at a  facility. No appointment is needed for lab tests.  For fasting blood tests:  Please do not consume calories for 10-12 hours prior to this blood test. It is ok to drink water, you should be hydrated.  Please do not take vitamins, supplements or thyroid medication before your blood is drawn this day.   Most lab results should be available for your review on the  My Chart portal within 48 hours. Please contact the office if you have not received results within 2 weeks of obtaining the test.  See you in February.  Sooner if needed.  Thanks for bringing your bp cuff and please bring to every appt.  Check bp the am of your next appt, at home. If bp is going up and is at 150 systolic or higher, take an extra 1/2 tab of metoprolol.  END INFO FROM PRIOR VISIT     HPI  Here for wellness exam , annual physical and follow up on hypothyroidism, osteoporosis on prolia, and other issues, review of recent labs and imaging.. Ecg done for med monitoring shows mild sinus arrhythmia rate 56.  Health maintenance items last completed:  Colonoscopy: 08/24/2021- polyps X2 (sessile serrated adenoma and hyperplastic).  Patient was told to repeat in 5 years.  Due 08/24/2026.  Advised patient to check with GI to see if  this needs to be completed sooner than 5 years due to sessile serrated adenoma.  EGD: 03/11/2022; stricture and gastric polyp, note reviewed, prilosec 20mg changed to protonix 20mg.  Mammogram: 10/07/2024; No mammographic evidence of malignancy. BI-RADS Category:  1 Negative. Recommendation:  Annual Screening.  Due 10/07/2025.  Bone Density: 10/20/2023; improved to osteopenia; left femur total T-score was -1.8 and left femur neck T-score was -2.3.  Due 10/20/2025.    Urine albumin if HTN or DM2: On 01/10/24 was <7.0 mg/L.  Pap: 2/2020- negative.  Pelvic done by URO/GYN 10/2023, note reviewed, normal. Has pessary.   Vaccines due or not completed: All vaccines UTD.  Last Health Maintenance exam: 02/27/2024.    Patient Care Team:  Mouna Esparza MD as PCP - General  Mouna Esparza MD as PCP - [employer] Advantage PCP  Mouna Esparza MD as PCP - [employer] Medicare Advantage PCP  Jose Guadalupe Castrejon MD as Referring Physician (Retina Ophthalmology)  Eren Small DO as Consulting Physician (Pulmonary Disease)  Gail Juarez MD as Surgeon (Obstetrics and Gynecology)  Almas Marin MD as Referring Physician (Gastroenterology)     I reviewed current and past Medical, Surgical and Family History, as well as allergies and updated the medical record.    I reviewed the questions and answers of the Medicare Wellness Visit form including screenings for depression, alcohol, and fall risk.    I reviewed medications from this prescribing practitioner, outside practitioners, clinical pharmacist and patient directed including OTCs, herbal therapies and supplements, as well as prescriptions. These are documented in the medical record.      I discussed code status with the patient, and they understand the difference between full code and DNR.  Patient is full code.  I discussed HCPOA and LW.  Her first HCPOA is her , Ricco Ibrahim.    I reviewed and updated the patient's Care Team in the chart.    We  discussed Health Maintenance items due including DEXA scan, mammogram and colonoscopy.  Advised patient to check with GI to see if this needs to be completed sooner than 5 years due to sessile serrated adenoma.  Vaccines are all UTD.    Patient is attending PT for her neck.    Hypertension:  BP today is 138/62.    Follow up on cardiovascular conditions:  Patient continues on metoprolol succinate.  Patient states that her BP this morning was 136/55 with HR 72, and 1 hour later was 156/60 with HR 66  She did take metoprolol this morning.  She reports changing her batteries on 01/09/2025.  She also states that she has gotten a diastolic of 90 on a few occasions.  Patient did bring a list.  I reviewed her list and her BP has been pretty good in December with fluctuating BP results starting in January.  We discussed that her variation in  BP could correlate with her stress with her .  Recommended taking 1/2 tab of metoprolol succinate 50 mg in the AM and continue taking the 1 and 1/2 tabs at night.  Advised to monitor her BP and call with concerns of dizziness or if BP is too low or if heart rate stays below 55, consistently.  Cardiac:   Chest pain?No  Palpitations? No  Increased mitchell?  No  Other:  Resp:   Shortness of breath?No  Wheezing No  Cough No  Other:  Extremities:   Leg or ankle swelling?No   Claudication?No  Other:  Neuro:  DizzinessNo  SyncopeNo   Blurred visionNo  New headaches No  Other:  Medications:Taking them regularly.Yes  Side effects.No    Hyperlipidemia:  No myalgias, nausea, abdominal pain.  Meds: Taking regularly, no adverse effect.  Patient continues on pravastatin 20 mg daily.  Labs:  Last LDL direct was 69 mg/dL on 02/06/2025.  LDL goal: <70 mg/dL.  Coronary artery calcium score of 0 on 05/25/2022. Of note, most recent CT Chest showed mild coronary artery calcifications.  We discussed why she is taking the pravastatin.  We discussed continuing this medication and the benefits of taking the  statin medication.  Patient denies side effects.  Advised to continue same medication.     Hypothyroidism:  Patient continues on levothyroxine 88 mcg six days per week and no medication on Sundays.  Last TSH was 1.19, WNL, on 02/06/2025.  Patient was wondering about brand name Synthroid versus generic and the difference as it is confusing.  I reviewed the difference and all questions were answered.  Patient will continue on levothyroxine 88 mcg as prescribed.    Osteopenia:  Patient continues on Prolia 60 mg injection every 6 months.    GERD:  Stable.  Patient continues on Nexium 20 mg daily.    Left Wrist Pain:  Patient reports intermittent left wrist pain but states that if she wears her brace, it improves.      We discussed her latest blood work which was WNL.    Orders placed for prescriptions as required.    Follow up 06/09/2025 for BP check.    Review of Systems  All systems reviewed and are negative unless otherwise stated in HPI or noted in writing on the written   3  page history and review of systems document reviewed by me and  scanned in with this visit. This is scanned either to notes or to media, with today's date.    Objective   The following test results have been reviewed:  Latest Complete Lab Results:  CBC:   Lab Results   Component Value Date    WBC 6.4 02/06/2025    RBC 4.66 02/06/2025    HGB 13.9 02/06/2025    HCT 42.8 02/06/2025    MCV 91.8 02/06/2025    MCH 29.8 02/06/2025    MCHC 32.5 02/06/2025     02/06/2025    MPV 11.2 02/06/2025     CBC w/Diff:   Lab Results   Component Value Date    WBC 6.4 02/06/2025    NRBC 0.0 01/10/2024    RBC 4.66 02/06/2025    HGB 13.9 02/06/2025    HCT 42.8 02/06/2025    MCV 91.8 02/06/2025    MCHC 32.5 02/06/2025     02/06/2025    RDW 12.0 02/06/2025    NEUTOPHILPCT 63.0 01/10/2024    IGPCT 0.2 01/10/2024    LYMPHOPCT 24.9 02/06/2025    MONOPCT 11.4 02/06/2025    EOSPCT 1.7 02/06/2025    BASOPCT 0.8 02/06/2025    NEUTROABS 4.14 01/10/2024     LYMPHSABS 1.44 01/10/2024    MONOSABS 0.84 (H) 01/10/2024    EOSABS 109 02/06/2025    BASOSABS 51 02/06/2025      CMP:  Lab Results   Component Value Date    GLUCOSE 83 02/06/2025     02/06/2025    K 4.3 02/06/2025     02/06/2025    CO2 27 02/06/2025    ANIONGAP 10 02/06/2025    BUN 20 02/06/2025    CREATININE 0.68 02/06/2025    GFRF 79 08/15/2023    CALCIUM 9.5 02/06/2025    ALBUMIN 3.9 02/06/2025    ALKPHOS 70 02/06/2025    PROT 6.5 02/06/2025    AST 23 02/06/2025    BILITOT 0.4 02/06/2025    ALT 15 02/06/2025         Lipid:   Lab Results   Component Value Date    CHOL 134 02/06/2025    HDL 57 02/06/2025    CHHDL 2.4 02/06/2025    LDLF 75 04/21/2022    VLDL 11 06/13/2024    TRIG 56 02/06/2025     LDL Direct:  Lab Results   Component Value Date    LDLDIRECT 69 02/06/2025      TSH:   Lab Results   Component Value Date    TSH 1.19 02/06/2025      B12:  Lab Results   Component Value Date    MIVULHEI19 486 02/06/2025     Vitamin D:  Lab Results   Component Value Date    VITD25 59 02/06/2025      HgA1c:   Lab Results   Component Value Date    HGBA1C 5.5 06/13/2024    ISIDLXII3F 111 06/13/2024     === 10/07/24 ===  CT CHEST WO IV CONTRAST  - Impression -  1.  Redemonstration multiple nodules in the lungs bilaterally from to  the right upper lobe, unchanged in size and number from the prior.  Recommend follow-up are suggested in 12 months.  2. Redemonstration of centrilobular tree-in-bud opacities in the  upper lobes bilaterally, similar to the prior, suggestive of  underlying inflammatory process.  MACRO:  None Signed by: Mariusz Hoover 10/9/2024 6:41 AM  Dictation workstation:   [id number]     Mammogram  ok.  Vitals:      2/27/2024     9:57 AM 7/3/2024     9:59 AM 10/7/2024     1:47 PM 10/16/2024    11:31 AM 10/24/2024    11:00 AM 11/12/2024     2:11 PM 2/12/2025    10:00 AM   Vitals   Systolic 154 136  163 166 142 138   Diastolic 62 54  70 75 62 62   BP Location  Left arm  Right arm      Heart  "Rate 62 72  65 70 72 72   Temp    36.6 °C (97.8 °F)      Resp  18   20 18 16   Height 1.651 m (5' 5\") 1.651 m (5' 5\") 1.651 m (5' 5\") 1.651 m (5' 5\")  1.651 m (5' 5\") 1.645 m (5' 4.75\")   Weight (lb) 135.2 139 138.89 143 144.6 142 148   BMI 22.5 kg/m2 23.13 kg/m2 23.11 kg/m2 23.8 kg/m2 24.06 kg/m2 23.63 kg/m2 24.82 kg/m2   BSA (m2) 1.68 m2 1.7 m2 1.7 m2 1.73 m2 1.73 m2 1.72 m2 1.75 m2       Physical Exam   General: Patient is known to me, looks well, is in usual state of health, with  no obvious distress.  Head: Normocephalic  Eyes: PERRL, EOMI.  No scleral icterus or conjunctival abnormality  Ears: Normal canals and TM's.  Patient does wear bilateral hearing aids but only has one in today as the other one is \"acting up\".  No infection noted.  Oropharynx: Well hydrated mucosa. no lesions, erythema. palate moves well.  Neck: No masses, no cervical (A/P/ or Lateral) adenopathy.  Carotid pulses normal and no bruits.  Left side of thyroid absent; remaining thyroid not enlarged and no nodules.  Chest: Normal AP diameter. No supraclavicular adenopathy.  Lungs: Clear to auscultation: no rales , wheezes, rhonchi, rubs, examined bilaterally, ant/post /lateral. RR normal.  Heart: RRR. No MGCR S1 S2 normal.  Abdomen: BS normal, no bruits. No hepatosplenomegaly. No abdominal mass or tenderness. No distension.  Extremities: No upper extremity edema. No lower leg edema. No ischemia. Patient has chronic varicosities in bilateral extremities.  Joints: No synovitis seen. No deformities.  Pulses: Normal posterior tibialis pulses, normal dorsalis pedis pulses. normal radial pulses. Normal femoral pulses without bruits.  Neuro: CN 2-12 intact . Alert, oriented, appropriate.  No focal weakness observed. No tremors. Ambulation is normal .  Psych: Mood and affect normal. No cognitive deficits noted during this exam. Alert, oriented, appropriate.  Skin: No rash, petechiae or excessive bruising.  Lymph: no SC axillary or inguinal adenopathy "     Breast Exam : Symmetric appearance. No masses, nipple discharge, skin retraction, axillary or supraclavicular adenopathy.  Pelvic is as per urogyn.    Annual  history and physical completed including  ROS, PE.   Medicare wellness visit  completed including:  Immunizations,   Health Maintenance items,  Discussion of advanced directives and code status, which is: full code ok cpr-  Fall risk and prevention addressed.  Functionality and pain were assessed.   Cancer screening testing was addressed as appropriate-see patient discussion/orders.   Medications were discussed and reviewed/reconciled and refill need assessed/handled.   Patient states taking their medication regularly and appropriately.  Also:   Depression screening PhQ-2 completed: neg  Alcohol misuse screen: neg    In addition to the wellness visit and screening, the above medical issues were addressed:  As well as results of testing completed since last visit.    Mary was seen today for medicare annual wellness visit subsequent.  Diagnoses and all orders for this visit:  Encounter for subsequent annual wellness visit (AWV) in Medicare patient (Primary)  Reactive hypertension  -     ECG 12 Lead  -     metoprolol succinate XL (Toprol-XL) 50 mg 24 hr tablet; Take 1/2 tab in the am and 1 and 1/2 tabs at night.  Hypothyroidism, unspecified type  -     levothyroxine (Synthroid) 88 mcg tablet; Take one tab daily six days per week and none on Sundays.Take on an empty stomach at the same time each day, either 30 to 60 minutes prior to breakfast  Gastroesophageal reflux disease, unspecified whether esophagitis present  Osteopenia of neck of left femur  -     XR DEXA bone density; Future  Encounter to discuss test results  Encounter for annual physical exam  Full code status  Current use of beta blocker  -     ECG 12 Lead  Granulomatous lung disease (Multi)  Visit for screening mammogram  -     BI mammo bilateral screening tomosynthesis; Future  Varicose veins  of both legs with edema  Localized osteoporosis without current pathological fracture  Vitamin D deficiency  Coronary artery calcification  -     pravastatin (Pravachol) 20 mg tablet; Take 1 tablet (20 mg) by mouth once daily.  Galt cardiac risk >20% in next 10 years  -     pravastatin (Pravachol) 20 mg tablet; Take 1 tablet (20 mg) by mouth once daily.    Conditions are stable we adjusted the metoprolol.and refilled it as well as her thyroid and cholesterol medication.  LDL is acceptable on current statin dose.osteoporosis, she tolerates the prolia, keeps up with calcium and vitamin d.  Lung granulomas asymptomatic  Patient Instructions:  It was nice to see you today.  Labs are good.    Ask Dr Marin about timing of colonoscopy, is it this year or next? Am asking as you had a sessile serrated adenoma on the last one.    Mammogram October 2025.  Bone density after October 20,2025     Blood pressure: Take 1/2 tab metoprolol succinate /Toprol in the am and continue taking the 1 and 1/2 tabs at night.  Watch bp. Call if concerns of dizziness, if bp is too low or if heart rate stays below 55 consistently.    Follow up 3-4 months on BP.  Scribe Attestation  By signing my name below, IIrma Scribe   attest that this documentation has been prepared under the direction and in the presence of Mouna Esparza MD.

## 2025-02-04 NOTE — TELEPHONE ENCOUNTER
Spoke with Mary and reminded her of her upcoming Wellness visit and active labs needing drawn prior to her appt per Dr Esparza request. Mary has already scheduled an appt with [facility] lab to have labs drawn.

## 2025-02-08 LAB
25(OH)D3+25(OH)D2 SERPL-MCNC: 59 NG/ML (ref 30–100)
ALBUMIN SERPL-MCNC: 3.9 G/DL (ref 3.6–5.1)
ALBUMIN/CREAT UR: ABNORMAL MG/G CREAT
ALP SERPL-CCNC: 70 U/L (ref 37–153)
ALT SERPL-CCNC: 15 U/L (ref 6–29)
ANION GAP SERPL CALCULATED.4IONS-SCNC: 10 MMOL/L (CALC) (ref 7–17)
AST SERPL-CCNC: 23 U/L (ref 10–35)
BASOPHILS # BLD AUTO: 51 CELLS/UL (ref 0–200)
BASOPHILS NFR BLD AUTO: 0.8 %
BILIRUB SERPL-MCNC: 0.4 MG/DL (ref 0.2–1.2)
BUN SERPL-MCNC: 20 MG/DL (ref 7–25)
CALCIUM SERPL-MCNC: 9.5 MG/DL (ref 8.6–10.4)
CHLORIDE SERPL-SCNC: 106 MMOL/L (ref 98–110)
CHOLEST SERPL-MCNC: 134 MG/DL
CHOLEST/HDLC SERPL: 2.4 (CALC)
CK SERPL-CCNC: 47 U/L (ref 16–215)
CO2 SERPL-SCNC: 27 MMOL/L (ref 20–32)
CREAT SERPL-MCNC: 0.68 MG/DL (ref 0.6–0.95)
CREAT UR-MCNC: 19 MG/DL (ref 20–275)
EGFRCR SERPLBLD CKD-EPI 2021: 88 ML/MIN/1.73M2
EOSINOPHIL # BLD AUTO: 109 CELLS/UL (ref 15–500)
EOSINOPHIL NFR BLD AUTO: 1.7 %
ERYTHROCYTE [DISTWIDTH] IN BLOOD BY AUTOMATED COUNT: 12 % (ref 11–15)
GLUCOSE SERPL-MCNC: 83 MG/DL (ref 65–99)
HCT VFR BLD AUTO: 42.8 % (ref 35–45)
HDLC SERPL-MCNC: 57 MG/DL
HGB BLD-MCNC: 13.9 G/DL (ref 11.7–15.5)
LDLC SERPL CALC-MCNC: 63 MG/DL (CALC)
LDLC SERPL DIRECT ASSAY-MCNC: 69 MG/DL
LYMPHOCYTES # BLD AUTO: 1594 CELLS/UL (ref 850–3900)
LYMPHOCYTES NFR BLD AUTO: 24.9 %
MCH RBC QN AUTO: 29.8 PG (ref 27–33)
MCHC RBC AUTO-ENTMCNC: 32.5 G/DL (ref 32–36)
MCV RBC AUTO: 91.8 FL (ref 80–100)
MICROALBUMIN UR-MCNC: <0.2 MG/DL
MONOCYTES # BLD AUTO: 730 CELLS/UL (ref 200–950)
MONOCYTES NFR BLD AUTO: 11.4 %
NEUTROPHILS # BLD AUTO: 3917 CELLS/UL (ref 1500–7800)
NEUTROPHILS NFR BLD AUTO: 61.2 %
NONHDLC SERPL-MCNC: 77 MG/DL (CALC)
PLATELET # BLD AUTO: 191 THOUSAND/UL (ref 140–400)
PMV BLD REES-ECKER: 11.2 FL (ref 7.5–12.5)
POTASSIUM SERPL-SCNC: 4.3 MMOL/L (ref 3.5–5.3)
PROT SERPL-MCNC: 6.5 G/DL (ref 6.1–8.1)
RBC # BLD AUTO: 4.66 MILLION/UL (ref 3.8–5.1)
SODIUM SERPL-SCNC: 143 MMOL/L (ref 135–146)
TRIGL SERPL-MCNC: 56 MG/DL
TSH SERPL-ACNC: 1.19 MIU/L (ref 0.4–4.5)
VIT B12 SERPL-MCNC: 486 PG/ML (ref 200–1100)
WBC # BLD AUTO: 6.4 THOUSAND/UL (ref 3.8–10.8)

## 2025-02-12 ENCOUNTER — APPOINTMENT (OUTPATIENT)
Dept: PRIMARY CARE | Facility: CLINIC | Age: 80
End: 2025-02-12
Payer: MEDICARE

## 2025-02-12 VITALS
BODY MASS INDEX: 24.66 KG/M2 | RESPIRATION RATE: 16 BRPM | HEART RATE: 72 BPM | DIASTOLIC BLOOD PRESSURE: 62 MMHG | SYSTOLIC BLOOD PRESSURE: 138 MMHG | HEIGHT: 65 IN | WEIGHT: 148 LBS

## 2025-02-12 DIAGNOSIS — Z12.31 VISIT FOR SCREENING MAMMOGRAM: ICD-10-CM

## 2025-02-12 DIAGNOSIS — Z79.899 CURRENT USE OF BETA BLOCKER: ICD-10-CM

## 2025-02-12 DIAGNOSIS — E55.9 VITAMIN D DEFICIENCY: ICD-10-CM

## 2025-02-12 DIAGNOSIS — M85.852 OSTEOPENIA OF NECK OF LEFT FEMUR: ICD-10-CM

## 2025-02-12 DIAGNOSIS — I83.893 VARICOSE VEINS OF BOTH LEGS WITH EDEMA: ICD-10-CM

## 2025-02-12 DIAGNOSIS — I25.10 CORONARY ARTERY CALCIFICATION: ICD-10-CM

## 2025-02-12 DIAGNOSIS — Z00.00 ENCOUNTER FOR SUBSEQUENT ANNUAL WELLNESS VISIT (AWV) IN MEDICARE PATIENT: Primary | ICD-10-CM

## 2025-02-12 DIAGNOSIS — Z91.89 FRAMINGHAM CARDIAC RISK >20% IN NEXT 10 YEARS: ICD-10-CM

## 2025-02-12 DIAGNOSIS — I10 REACTIVE HYPERTENSION: ICD-10-CM

## 2025-02-12 DIAGNOSIS — J84.10 GRANULOMATOUS LUNG DISEASE (MULTI): ICD-10-CM

## 2025-02-12 DIAGNOSIS — Z71.2 ENCOUNTER TO DISCUSS TEST RESULTS: ICD-10-CM

## 2025-02-12 DIAGNOSIS — E03.9 HYPOTHYROIDISM, UNSPECIFIED TYPE: ICD-10-CM

## 2025-02-12 DIAGNOSIS — K21.9 GASTROESOPHAGEAL REFLUX DISEASE, UNSPECIFIED WHETHER ESOPHAGITIS PRESENT: ICD-10-CM

## 2025-02-12 DIAGNOSIS — M81.6 LOCALIZED OSTEOPOROSIS WITHOUT CURRENT PATHOLOGICAL FRACTURE: ICD-10-CM

## 2025-02-12 DIAGNOSIS — Z00.00 ENCOUNTER FOR ANNUAL PHYSICAL EXAM: ICD-10-CM

## 2025-02-12 DIAGNOSIS — Z78.9 FULL CODE STATUS: ICD-10-CM

## 2025-02-12 PROBLEM — R79.89 ABNORMAL TSH: Status: RESOLVED | Noted: 2023-01-30 | Resolved: 2025-02-12

## 2025-02-12 PROBLEM — M65.949 SYNOVITIS OF HAND: Status: RESOLVED | Noted: 2023-01-30 | Resolved: 2025-02-12

## 2025-02-12 PROBLEM — U07.1 COVID-19 VIRUS INFECTION: Status: RESOLVED | Noted: 2023-01-30 | Resolved: 2025-02-12

## 2025-02-12 PROBLEM — R60.9 SOFT TISSUE SWELLING OF BACK: Status: RESOLVED | Noted: 2023-01-30 | Resolved: 2025-02-12

## 2025-02-12 PROBLEM — R22.42 LOWER LEG MASS, LEFT: Status: RESOLVED | Noted: 2023-01-30 | Resolved: 2025-02-12

## 2025-02-12 PROBLEM — B37.0 ORAL CANDIDIASIS: Status: RESOLVED | Noted: 2023-01-30 | Resolved: 2025-02-12

## 2025-02-12 PROCEDURE — 1159F MED LIST DOCD IN RCRD: CPT | Performed by: INTERNAL MEDICINE

## 2025-02-12 PROCEDURE — 93000 ELECTROCARDIOGRAM COMPLETE: CPT | Performed by: INTERNAL MEDICINE

## 2025-02-12 PROCEDURE — 1126F AMNT PAIN NOTED NONE PRSNT: CPT | Performed by: INTERNAL MEDICINE

## 2025-02-12 PROCEDURE — 1123F ACP DISCUSS/DSCN MKR DOCD: CPT | Performed by: INTERNAL MEDICINE

## 2025-02-12 PROCEDURE — 99213 OFFICE O/P EST LOW 20 MIN: CPT | Performed by: INTERNAL MEDICINE

## 2025-02-12 PROCEDURE — 3078F DIAST BP <80 MM HG: CPT | Performed by: INTERNAL MEDICINE

## 2025-02-12 PROCEDURE — 1157F ADVNC CARE PLAN IN RCRD: CPT | Performed by: INTERNAL MEDICINE

## 2025-02-12 PROCEDURE — 1158F ADVNC CARE PLAN TLK DOCD: CPT | Performed by: INTERNAL MEDICINE

## 2025-02-12 PROCEDURE — G0439 PPPS, SUBSEQ VISIT: HCPCS | Performed by: INTERNAL MEDICINE

## 2025-02-12 PROCEDURE — 99397 PER PM REEVAL EST PAT 65+ YR: CPT | Performed by: INTERNAL MEDICINE

## 2025-02-12 PROCEDURE — 1160F RVW MEDS BY RX/DR IN RCRD: CPT | Performed by: INTERNAL MEDICINE

## 2025-02-12 PROCEDURE — 1170F FXNL STATUS ASSESSED: CPT | Performed by: INTERNAL MEDICINE

## 2025-02-12 PROCEDURE — 3075F SYST BP GE 130 - 139MM HG: CPT | Performed by: INTERNAL MEDICINE

## 2025-02-12 RX ORDER — LEVOTHYROXINE SODIUM 88 UG/1
TABLET ORAL
Qty: 90 TABLET | Refills: 3 | Status: SHIPPED | OUTPATIENT
Start: 2025-02-12

## 2025-02-12 RX ORDER — METOPROLOL SUCCINATE 50 MG/1
TABLET, EXTENDED RELEASE ORAL
Qty: 180 TABLET | Refills: 3 | Status: SHIPPED | OUTPATIENT
Start: 2025-02-12

## 2025-02-12 RX ORDER — PRAVASTATIN SODIUM 20 MG/1
20 TABLET ORAL DAILY
Qty: 90 TABLET | Refills: 3 | Status: SHIPPED | OUTPATIENT
Start: 2025-02-12 | End: 2026-02-12

## 2025-02-12 SDOH — ECONOMIC STABILITY: FOOD INSECURITY: WITHIN THE PAST 12 MONTHS, YOU WORRIED THAT YOUR FOOD WOULD RUN OUT BEFORE YOU GOT MONEY TO BUY MORE.: NEVER TRUE

## 2025-02-12 SDOH — ECONOMIC STABILITY: INCOME INSECURITY: IN THE LAST 12 MONTHS, WAS THERE A TIME WHEN YOU WERE NOT ABLE TO PAY THE MORTGAGE OR RENT ON TIME?: NO

## 2025-02-12 SDOH — ECONOMIC STABILITY: FOOD INSECURITY: WITHIN THE PAST 12 MONTHS, THE FOOD YOU BOUGHT JUST DIDN'T LAST AND YOU DIDN'T HAVE MONEY TO GET MORE.: NEVER TRUE

## 2025-02-12 SDOH — ECONOMIC STABILITY: GENERAL
WHICH OF THE FOLLOWING DO YOU KNOW HOW TO USE AND HAVE ACCESS TO EVERY DAY? (CHOOSE ALL THAT APPLY): DESKTOP COMPUTER, LAPTOP COMPUTER, OR TABLET WITH BROADBAND INTERNET CONNECTION;SMARTPHONE WITH CELLULAR DATA PLAN

## 2025-02-12 SDOH — HEALTH STABILITY: PHYSICAL HEALTH: ON AVERAGE, HOW MANY DAYS PER WEEK DO YOU ENGAGE IN MODERATE TO STRENUOUS EXERCISE (LIKE A BRISK WALK)?: 3 DAYS

## 2025-02-12 SDOH — HEALTH STABILITY: PHYSICAL HEALTH: ON AVERAGE, HOW MANY MINUTES DO YOU ENGAGE IN EXERCISE AT THIS LEVEL?: 30 MIN

## 2025-02-12 ASSESSMENT — ACTIVITIES OF DAILY LIVING (ADL)
DOING HOUSEWORK: INDEPENDENT
GROOMING: INDEPENDENT
TOILETING: INDEPENDENT
PILL BOX USED: YES
MANAGING FINANCES: INDEPENDENT
JUDGMENT_ADEQUATE_SAFELY_COMPLETE_DAILY_ACTIVITIES: YES
EATING: INDEPENDENT
USING TRANSPORTATION: INDEPENDENT
BATHING: INDEPENDENT
NEEDS ASSISTANCE WITH FOOD: INDEPENDENT
FEEDING YOURSELF: INDEPENDENT
HEARING - LEFT EAR: HEARING AID
USING TELEPHONE: INDEPENDENT
STIL DRIVING: YES
PATIENT'S MEMORY ADEQUATE TO SAFELY COMPLETE DAILY ACTIVITIES?: YES
DRESSING YOURSELF: INDEPENDENT
WALKS IN HOME: INDEPENDENT
TAKING MEDICATION: INDEPENDENT
PREPARING MEALS: INDEPENDENT
HEARING - RIGHT EAR: HEARING AID
GROCERY SHOPPING: INDEPENDENT
ADEQUATE_TO_COMPLETE_ADL: YES
ASSISTIVE_DEVICE: HEARING AID - LEFT

## 2025-02-12 ASSESSMENT — ANXIETY QUESTIONNAIRES
4. TROUBLE RELAXING: NOT AT ALL
1. FEELING NERVOUS, ANXIOUS, OR ON EDGE: NOT AT ALL
IF YOU CHECKED OFF ANY PROBLEMS ON THIS QUESTIONNAIRE, HOW DIFFICULT HAVE THESE PROBLEMS MADE IT FOR YOU TO DO YOUR WORK, TAKE CARE OF THINGS AT HOME, OR GET ALONG WITH OTHER PEOPLE: NOT DIFFICULT AT ALL
5. BEING SO RESTLESS THAT IT IS HARD TO SIT STILL: NOT AT ALL
GAD7 TOTAL SCORE: 0
6. BECOMING EASILY ANNOYED OR IRRITABLE: NOT AT ALL
3. WORRYING TOO MUCH ABOUT DIFFERENT THINGS: NOT AT ALL
7. FEELING AFRAID AS IF SOMETHING AWFUL MIGHT HAPPEN: NOT AT ALL
2. NOT BEING ABLE TO STOP OR CONTROL WORRYING: NOT AT ALL

## 2025-02-12 ASSESSMENT — SOCIAL DETERMINANTS OF HEALTH (SDOH)
WITHIN THE LAST YEAR, HAVE YOU BEEN AFRAID OF YOUR PARTNER OR EX-PARTNER?: NO
HOW OFTEN DO YOU ATTEND CHURCH OR RELIGIOUS SERVICES?: MORE THAN 4 TIMES PER YEAR
IN THE PAST 12 MONTHS, HAS THE ELECTRIC, GAS, OIL, OR WATER COMPANY THREATENED TO SHUT OFF SERVICE IN YOUR HOME?: NO
HOW HARD IS IT FOR YOU TO PAY FOR THE VERY BASICS LIKE FOOD, HOUSING, MEDICAL CARE, AND HEATING?: NOT HARD AT ALL
HOW OFTEN DO YOU GET TOGETHER WITH FRIENDS OR RELATIVES?: ONCE A WEEK
HOW OFTEN DO YOU ATTENT MEETINGS OF THE CLUB OR ORGANIZATION YOU BELONG TO?: NEVER
WITHIN THE LAST YEAR, HAVE YOU BEEN HUMILIATED OR EMOTIONALLY ABUSED IN OTHER WAYS BY YOUR PARTNER OR EX-PARTNER?: NO
WITHIN THE LAST YEAR, HAVE YOU BEEN KICKED, HIT, SLAPPED, OR OTHERWISE PHYSICALLY HURT BY YOUR PARTNER OR EX-PARTNER?: NO
IN A TYPICAL WEEK, HOW MANY TIMES DO YOU TALK ON THE PHONE WITH FAMILY, FRIENDS, OR NEIGHBORS?: TWICE A WEEK
WITHIN THE LAST YEAR, HAVE TO BEEN RAPED OR FORCED TO HAVE ANY KIND OF SEXUAL ACTIVITY BY YOUR PARTNER OR EX-PARTNER?: NO
DO YOU BELONG TO ANY CLUBS OR ORGANIZATIONS SUCH AS CHURCH GROUPS UNIONS, FRATERNAL OR ATHLETIC GROUPS, OR SCHOOL GROUPS?: NO

## 2025-02-12 ASSESSMENT — LIFESTYLE VARIABLES
HOW OFTEN DO YOU HAVE SIX OR MORE DRINKS ON ONE OCCASION: NEVER
HOW MANY STANDARD DRINKS CONTAINING ALCOHOL DO YOU HAVE ON A TYPICAL DAY: PATIENT DOES NOT DRINK
SKIP TO QUESTIONS 9-10: 1
HOW OFTEN DO YOU HAVE A DRINK CONTAINING ALCOHOL: NEVER
AUDIT-C TOTAL SCORE: 0

## 2025-02-12 ASSESSMENT — COLUMBIA-SUICIDE SEVERITY RATING SCALE - C-SSRS
2. HAVE YOU ACTUALLY HAD ANY THOUGHTS OF KILLING YOURSELF?: NO
1. IN THE PAST MONTH, HAVE YOU WISHED YOU WERE DEAD OR WISHED YOU COULD GO TO SLEEP AND NOT WAKE UP?: NO
6. HAVE YOU EVER DONE ANYTHING, STARTED TO DO ANYTHING, OR PREPARED TO DO ANYTHING TO END YOUR LIFE?: NO

## 2025-02-12 ASSESSMENT — PATIENT HEALTH QUESTIONNAIRE - PHQ9
SUM OF ALL RESPONSES TO PHQ9 QUESTIONS 1 AND 2: 0
1. LITTLE INTEREST OR PLEASURE IN DOING THINGS: NOT AT ALL
2. FEELING DOWN, DEPRESSED OR HOPELESS: NOT AT ALL

## 2025-02-12 ASSESSMENT — PAIN SCALES - GENERAL: PAINLEVEL_OUTOF10: 0-NO PAIN

## 2025-02-12 NOTE — PATIENT INSTRUCTIONS
It was nice to see you today.  Labs are good.    Ask Dr Marin about timing of colonoscopy, is it this year or next? Am asking as you had a sessile serrated adenoma on the last one.    Mammogram October 2025.  Bone density after October 20,2025     Blood pressure: Take 1/2 tab metoprolol succinate /Toprol in the am and continue taking the 1 and 1/2 tabs at night.  Watch bp. Call if concerns of dizziness, if bp is too low or if heart rate stays below 55 consistently.    Follow up 3-4 months on BP.

## 2025-02-19 ENCOUNTER — SPECIALTY PHARMACY (OUTPATIENT)
Dept: PHARMACY | Facility: CLINIC | Age: 80
End: 2025-02-19

## 2025-02-19 DIAGNOSIS — M81.0 AGE RELATED OSTEOPOROSIS, UNSPECIFIED PATHOLOGICAL FRACTURE PRESENCE: ICD-10-CM

## 2025-02-19 RX ORDER — DENOSUMAB 60 MG/ML
INJECTION SUBCUTANEOUS
Qty: 1 ML | Refills: 0 | Status: SHIPPED | OUTPATIENT
Start: 2025-02-19 | End: 2026-02-19

## 2025-02-20 ENCOUNTER — OFFICE (OUTPATIENT)
Dept: URBAN - METROPOLITAN AREA CLINIC 26 | Facility: CLINIC | Age: 80
End: 2025-02-20
Payer: COMMERCIAL

## 2025-02-20 VITALS
TEMPERATURE: 97.5 F | SYSTOLIC BLOOD PRESSURE: 156 MMHG | DIASTOLIC BLOOD PRESSURE: 63 MMHG | HEART RATE: 65 BPM | HEIGHT: 65 IN | WEIGHT: 148 LBS

## 2025-02-20 DIAGNOSIS — Z86.0101 PERSONAL HISTORY OF ADENOMATOUS AND SERRATED COLON POLYPS: ICD-10-CM

## 2025-02-20 DIAGNOSIS — K21.9 GASTRO-ESOPHAGEAL REFLUX DISEASE WITHOUT ESOPHAGITIS: ICD-10-CM

## 2025-02-20 PROCEDURE — RXMED WILLOW AMBULATORY MEDICATION CHARGE

## 2025-02-20 PROCEDURE — 99213 OFFICE O/P EST LOW 20 MIN: CPT | Performed by: INTERNAL MEDICINE

## 2025-02-21 ENCOUNTER — SPECIALTY PHARMACY (OUTPATIENT)
Dept: PHARMACY | Facility: CLINIC | Age: 80
End: 2025-02-21

## 2025-02-27 ENCOUNTER — PHARMACY VISIT (OUTPATIENT)
Dept: PHARMACY | Facility: CLINIC | Age: 80
End: 2025-02-27
Payer: MEDICARE

## 2025-03-06 ENCOUNTER — APPOINTMENT (OUTPATIENT)
Dept: PRIMARY CARE | Facility: CLINIC | Age: 80
End: 2025-03-06
Payer: MEDICARE

## 2025-03-10 ENCOUNTER — APPOINTMENT (OUTPATIENT)
Dept: PRIMARY CARE | Facility: CLINIC | Age: 80
End: 2025-03-10
Payer: MEDICARE

## 2025-03-12 ENCOUNTER — CLINICAL SUPPORT (OUTPATIENT)
Dept: PRIMARY CARE | Facility: CLINIC | Age: 80
End: 2025-03-12
Payer: MEDICARE

## 2025-03-12 DIAGNOSIS — M81.0 AGE-RELATED OSTEOPOROSIS WITHOUT CURRENT PATHOLOGICAL FRACTURE: ICD-10-CM

## 2025-03-12 PROCEDURE — 96372 THER/PROPH/DIAG INJ SC/IM: CPT | Performed by: INTERNAL MEDICINE

## 2025-03-12 NOTE — PROGRESS NOTES
Mary is here for a nurse visit to receive her Prolia injection    Labs within range per prolia protocol  Dexa Bone Scan within range per  prolia protocol    Mary received her Prolia Injection in her upper right back arm, tolerated well.

## 2025-03-13 ENCOUNTER — TELEPHONE (OUTPATIENT)
Dept: PRIMARY CARE | Facility: CLINIC | Age: 80
End: 2025-03-13
Payer: MEDICARE

## 2025-03-13 NOTE — TELEPHONE ENCOUNTER
Izzy with [employer] calling on behalf of [employer], inquiring on Mary's prolia shot, would like to verify if  retail pharmacy or specialty pharmacy supplied it?    Ref # [id number]

## 2025-03-13 NOTE — TELEPHONE ENCOUNTER
Detailed message left on Izzy's identifying voicemail regarding her inquiry into Mary's prolia prescription.  Ref # [id number]

## 2025-04-24 ENCOUNTER — APPOINTMENT (OUTPATIENT)
Dept: OBSTETRICS AND GYNECOLOGY | Facility: CLINIC | Age: 80
End: 2025-04-24
Payer: MEDICARE

## 2025-04-29 DIAGNOSIS — R91.8 LUNG NODULES: ICD-10-CM

## 2025-06-03 ENCOUNTER — APPOINTMENT (OUTPATIENT)
Dept: PRIMARY CARE | Facility: CLINIC | Age: 80
End: 2025-06-03
Payer: MEDICARE

## 2025-06-03 NOTE — PROGRESS NOTES
"Patient ID: Mary Ibrahim is a 80 y.o. female who presents for Follow-up (Pt here for follow up and review. PT does have questions about Covid vaccine and what your thoughts are. Pt also wants to talk about in-network providers for \"female pelvic floor medicine\" Dr Juarez and Dr Doll are out of network.  I advised her to call her insurance company to inquire)    (LAST VISIT PLAN FROM: 02/12/2025:  Patient Instructions:  It was nice to see you today.  Labs are good.  Ask Dr Marin about timing of colonoscopy, is it this year or next? Am asking as you had a sessile serrated adenoma on the last one.  Mammogram October 2025.  Bone density after October 20,2025   Blood pressure: Take 1/2 tab metoprolol succinate /Toprol in the am and continue taking the 1 and 1/2 tabs at night.  Watch bp. Call if concerns of dizziness, if bp is too low or if heart rate stays below 55 consistently.  Follow up 3-4 months on BP.  END LAST VISIT PLAN)    HPI  Patient is a 80-year-old female who presents today for follow up on BP.    Patient inquired about the Covid vaccine and if she should obtain one and was wondering about the current status of Covid in the area.  We discussed that Covid cases are currently low but okay to obtain the vaccine.  Of note, it has been 6 months since her last Covid vaccine.  I provided the patient with the website to check on Covid infection status in the area.  Advised to check the website on a regular basis to monitor.    Patient wanted to discuss about in-network providers for \"pelvic floor medicine\" as Dr. Juarez and Dr. Doll are out of network.  Patient was advised to call her insurance company to inquire.  She reports that she was seeing Dr. Juarez, and she was part of the network but is no longer.  Patient requires a yearly visit to check her pessary / maintenance.  Order placed for referral to URO/GYN, Dr. Savita Valentine.     PAT:  BP today is 144/74.  Patient brought with her her home " readings which were in the 140s and 150s systolic, rarely under 130, with diastolic under 60 and HR in the 60s with one HR under 58.  Follow up on cardiovascular conditions:  Patient reports that she stopped taking metoprolol 25 mg in the AM and continues the 75 mg dose at night. We discussed adding chlorthalidone 25 mg daily.  Will recheck blood work in 2-3 weeks after starting this medication.  Advised adequate hydration of 64 ounces of water per day.  Cardiac:   Chest pain?No  Palpitations? No  Increased mitchell?  No  Other:  Resp:   Shortness of breath?No  Wheezing No  Cough No  Other:  Extremities:   Leg or ankle swelling?Yes RLE >than LLE.  Patient does wear compression stockings for her chronic venous varicosities of the legs.  Claudication?No  Other:  Neuro:  DizzinessNo  SyncopeNo   Blurred visionNo  New headaches No  Other:  Medications:Taking them regularly.Yes  Side effects.No    Orders placed for prescriptions as required.    Orders placed for blood work as required.    Follow up 09/10/2025.    Review of Systems  See ROS in HPI    Current Outpatient Medications   Medication Instructions    calcium carbonate (TUMS EXTRA STRENGTH) 600 mg, Daily    chlorthalidone (HYGROTON) 25 mg, oral, Daily    cholecalciferol (Vitamin D-3) 50 mcg (2,000 unit) capsule 1 capsule, Daily    cycloSPORINE (Restasis) 0.05 % ophthalmic emulsion 1 drop, Daily    esomeprazole (NEXIUM) 20 mg, Daily before breakfast    estradiol (ESTRACE) 0.5 g, vaginal, 2 times weekly, Use a pea sized amount in the vagina twice weekly at bedtime    levothyroxine (Synthroid) 88 mcg tablet Take one tab daily six days per week and none on Sundays.Take on an empty stomach at the same time each day, either 30 to 60 minutes prior to breakfast    metoprolol succinate XL (Toprol-XL) 50 mg 24 hr tablet Take 1 and 1/2 tabs at night.    multivitamin-iron-minerals-folic acid (Centrum Silver) 0.4 mg-300 mcg- 250 mcg tablet 1 tablet, Daily    peg  400-hypromellose-glycerin (Artificial Tears,pg-hypm-glyc,) 1-0.2-0.2 % drops As needed    pravastatin (PRAVACHOL) 20 mg, oral, Daily    Prolia 60 mg/mL syringe PROVIDER TO INJECT 60 MG (1 PREFILLED SYRINGE) BENEATH THE SKIN ONCE.    Tobradex ophthalmic ointment Apply 0.5 inches to left eye every other day. At night    triamcinolone (Kenalog) 0.1 % ointment Topical, Daily    trypsin/balsam peru/castor oil (OPTASE TOP) 1 Application, Daily    urea (Carmol) 40 % cream 1 Application, Daily     RX Allergies[1]  Objective    The following test results have been reviewed:  Latest Complete Lab Results:  CBC:   Lab Results   Component Value Date    WBC 6.4 02/06/2025    RBC 4.66 02/06/2025    HGB 13.9 02/06/2025    HCT 42.8 02/06/2025    MCV 91.8 02/06/2025    MCH 29.8 02/06/2025    MCHC 32.5 02/06/2025     02/06/2025    MPV 11.2 02/06/2025     CMP:  Lab Results   Component Value Date    GLUCOSE 83 02/06/2025     02/06/2025    K 4.3 02/06/2025     02/06/2025    CO2 27 02/06/2025    ANIONGAP 10 02/06/2025    BUN 20 02/06/2025    CREATININE 0.68 02/06/2025    GFRF 79 08/15/2023    CALCIUM 9.5 02/06/2025    ALBUMIN 3.9 02/06/2025    ALKPHOS 70 02/06/2025    PROT 6.5 02/06/2025    AST 23 02/06/2025    BILITOT 0.4 02/06/2025    ALT 15 02/06/2025      Lipid:   Lab Results   Component Value Date    CHOL 134 02/06/2025    HDL 57 02/06/2025    CHHDL 2.4 02/06/2025    LDLF 75 04/21/2022    VLDL 11 06/13/2024    TRIG 56 02/06/2025     LDL Direct:  Lab Results   Component Value Date    LDLDIRECT 69 02/06/2025      TSH:   Lab Results   Component Value Date    TSH 1.19 02/06/2025      B12:  Lab Results   Component Value Date    SELMCYBB83 486 02/06/2025     Vitamin D:  Lab Results   Component Value Date    VITD25 59 02/06/2025      HgA1c:   Lab Results   Component Value Date    HGBA1C 5.5 06/13/2024    YJFFQCJN7I 111 06/13/2024       Physical Exam  Vitals:      10/16/2024    11:31 AM 10/24/2024    11:00 AM 11/12/2024     " 2:11 PM 2/12/2025    10:00 AM 6/9/2025     2:30 PM 6/9/2025     2:32 PM 6/9/2025     4:03 PM   Vitals   Systolic 163 166 142 138  147 150   Diastolic 70 75 62 62  74 65   BP Location Right arm         Heart Rate 65 70 72 72  62    Temp 36.6 °C (97.8 °F)         Resp  20 18 16 18     Height 1.651 m (5' 5\")  1.651 m (5' 5\") 1.645 m (5' 4.75\") 1.651 m (5' 5\")     Weight (lb) 143 144.6 142 148 153     BMI 23.8 kg/m2 24.06 kg/m2 23.63 kg/m2 24.82 kg/m2 25.46 kg/m2     BSA (m2) 1.73 m2 1.73 m2 1.72 m2 1.75 m2 1.78 m2     Visit Report Report Report Report Report Report Report Report     Patient looks their usual self, is known to me, and is in no obvious distress.  HEENT:   Normocephalic, no facial asymmetry  Eyes: Sclera and conjunctiva are clear.  Neck: No adenopathy cervical (Ant/post/lat), no Supraclavicular nodes.   Thyroid normal.  Carotid pulses normal, no carotid bruits.  Lungs : RR normal. Clear to auscultation anterior, posterior and lateral. No rales, wheezes rhonchi or rubs. Good air exchange.  Heart: RRR. No Murmur, gallop, click or rub.  Examined upright and supine.  Abdomen: Bowel sounds normal, No bruits. No pulsatile mass. No hepatosplenomegaly, masses or tenderness. Soft, no guarding.  Vascular:  Posterior tibialis and dorsalis pedis pulses within normal limits bilaterally.   Extremities: No upper extremity edema. No lower extremity edema.   Musculoskeletal: No synovitis of joints seen. No new deformity.  Neuro: CN 2-12 intact. Alert, appropriate.  Ambulates independently.  No gross motor deficit.   No tremors.  Psych: normal mood and affect.  Skin: No rash, bruising petechiae or jaundice.    Mary was seen today for follow-up.  Diagnoses and all orders for this visit:  PAT (paroxysmal atrial tachycardia) (Primary)  Reactive hypertension  -     chlorthalidone (Hygroton) 25 mg tablet; Take 1 tablet (25 mg) by mouth once daily.  -     metoprolol succinate XL (Toprol-XL) 50 mg 24 hr tablet; Take 1 and 1/2 " tabs at night.  Hypertension, essential  -     chlorthalidone (Hygroton) 25 mg tablet; Take 1 tablet (25 mg) by mouth once daily.  -     Basic Metabolic Panel; Future  -     Magnesium; Future  -     Magnesium  Medication monitoring encounter  -     Basic Metabolic Panel; Future  -     Magnesium; Future  -     Magnesium  Increased frequency of urination  Urinary urgency  -     Referral to Urogynecology; Future  Urethral polyp  -     Referral to Urogynecology; Future  Uterine prolapse  -     Referral to Urogynecology; Future  Pessary maintenance  -     Referral to Urogynecology; Future     She is interested in wathcing what viruses are in the area gave her website at [facility]  See patient instructions in wrap up plan, orders and comments for treatment plan.  Patient Instructions:  [email or web address]    Watch for covid stats, mask if starting to creep up or can get an updated vaccine. Otherwsie vaccine later August, early September.  Flu vaccine October.    Blood pressure: Noted that you stopped taking the 25 mg metoprolol in the am and are continuing the 75 mg dose at night.  ADD chlorthalidone 25 mg daily.  Non fasting blood test in 2-3 weeks after starting this medication.    Call if it worsens your urinary issues.  Referral to Dr eduar Valentine urology, -check with the office to confirm they do pessary maintenance.    Follow up 3 months, monitor bp at home -may take a couple weeks for it to improve.    Hydrate 64 oz water per day.       I am the Internal Medicine physician providing ongoing chronic medical care for this patient, which is managed during and in between office visits.    Scribe Attestation  By signing my name below, IIrma Scribe   attest that this documentation has been prepared under the direction and in the presence of Mouna Esparza MD.          [1]   Allergies  Allergen Reactions    Sulfa (Sulfonamide Antibiotics) Other     Flu like  symptoms

## 2025-06-09 ENCOUNTER — APPOINTMENT (OUTPATIENT)
Dept: PRIMARY CARE | Facility: CLINIC | Age: 80
End: 2025-06-09
Payer: MEDICARE

## 2025-06-09 VITALS
HEIGHT: 65 IN | WEIGHT: 153 LBS | DIASTOLIC BLOOD PRESSURE: 65 MMHG | HEART RATE: 62 BPM | RESPIRATION RATE: 18 BRPM | BODY MASS INDEX: 25.49 KG/M2 | SYSTOLIC BLOOD PRESSURE: 150 MMHG

## 2025-06-09 DIAGNOSIS — N81.4 UTERINE PROLAPSE: ICD-10-CM

## 2025-06-09 DIAGNOSIS — Z51.81 MEDICATION MONITORING ENCOUNTER: ICD-10-CM

## 2025-06-09 DIAGNOSIS — I10 HYPERTENSION, ESSENTIAL: ICD-10-CM

## 2025-06-09 DIAGNOSIS — Z46.89 PESSARY MAINTENANCE: ICD-10-CM

## 2025-06-09 DIAGNOSIS — R39.15 URINARY URGENCY: ICD-10-CM

## 2025-06-09 DIAGNOSIS — N36.2 URETHRAL POLYP: ICD-10-CM

## 2025-06-09 DIAGNOSIS — I47.19 PAT (PAROXYSMAL ATRIAL TACHYCARDIA): Primary | ICD-10-CM

## 2025-06-09 DIAGNOSIS — I10 REACTIVE HYPERTENSION: ICD-10-CM

## 2025-06-09 DIAGNOSIS — R35.0 INCREASED FREQUENCY OF URINATION: ICD-10-CM

## 2025-06-09 PROBLEM — E87.5 SERUM POTASSIUM ELEVATED: Status: RESOLVED | Noted: 2023-01-30 | Resolved: 2025-06-09

## 2025-06-09 PROBLEM — U09.9 POST-COVID-19 CONDITION: Status: RESOLVED | Noted: 2023-01-30 | Resolved: 2025-06-09

## 2025-06-09 PROCEDURE — 3078F DIAST BP <80 MM HG: CPT | Performed by: INTERNAL MEDICINE

## 2025-06-09 PROCEDURE — 1160F RVW MEDS BY RX/DR IN RCRD: CPT | Performed by: INTERNAL MEDICINE

## 2025-06-09 PROCEDURE — 99214 OFFICE O/P EST MOD 30 MIN: CPT | Performed by: INTERNAL MEDICINE

## 2025-06-09 PROCEDURE — G2211 COMPLEX E/M VISIT ADD ON: HCPCS | Performed by: INTERNAL MEDICINE

## 2025-06-09 PROCEDURE — 3077F SYST BP >= 140 MM HG: CPT | Performed by: INTERNAL MEDICINE

## 2025-06-09 PROCEDURE — 1159F MED LIST DOCD IN RCRD: CPT | Performed by: INTERNAL MEDICINE

## 2025-06-09 RX ORDER — METOPROLOL SUCCINATE 50 MG/1
TABLET, EXTENDED RELEASE ORAL
Qty: 180 TABLET | Refills: 3 | Status: SHIPPED | OUTPATIENT
Start: 2025-06-09

## 2025-06-09 RX ORDER — CHLORTHALIDONE 25 MG/1
25 TABLET ORAL DAILY
Qty: 90 TABLET | Refills: 1 | Status: SHIPPED | OUTPATIENT
Start: 2025-06-09

## 2025-06-09 ASSESSMENT — PATIENT HEALTH QUESTIONNAIRE - PHQ9
SUM OF ALL RESPONSES TO PHQ9 QUESTIONS 1 AND 2: 0
2. FEELING DOWN, DEPRESSED OR HOPELESS: NOT AT ALL
1. LITTLE INTEREST OR PLEASURE IN DOING THINGS: NOT AT ALL

## 2025-06-09 NOTE — PATIENT INSTRUCTIONS
[email or web address]    Watch for covid stats, mask if starting to creep up or can get an updated vaccine. Otherwsie vaccine later August, early September.  Flu vaccine October.    Blood pressure: Noted that you stopped taking the 25 mg metoprolol in the am and are continuing the 75 mg dose at night.  ADD chlorthalidone 25 mg daily.  Non fasting blood test in 2-3 weeks after starting this medication.    Call if it worsens your urinary issues.  Referral to Dr eduar Valentine urology, -check with the office to confirm they do pessary maintenance.    Follow up 3 months, monitor bp at home -may take a couple weeks for it to improve.    Hydrate 64 oz water per day.

## 2025-06-13 LAB — MAGNESIUM SERPL-MCNC: 2.2 MG/DL (ref 1.5–2.5)

## 2025-06-23 DIAGNOSIS — I10 HYPERTENSION, ESSENTIAL: ICD-10-CM

## 2025-06-23 DIAGNOSIS — Z51.81 MEDICATION MONITORING ENCOUNTER: ICD-10-CM

## 2025-07-01 LAB
ANION GAP SERPL CALCULATED.4IONS-SCNC: 11 MMOL/L (CALC) (ref 7–17)
BUN SERPL-MCNC: 27 MG/DL (ref 7–25)
BUN/CREAT SERPL: 36 (CALC) (ref 6–22)
CALCIUM SERPL-MCNC: 10.1 MG/DL (ref 8.6–10.4)
CHLORIDE SERPL-SCNC: 103 MMOL/L (ref 98–110)
CO2 SERPL-SCNC: 28 MMOL/L (ref 20–32)
CREAT SERPL-MCNC: 0.76 MG/DL (ref 0.6–0.95)
EGFRCR SERPLBLD CKD-EPI 2021: 79 ML/MIN/1.73M2
GLUCOSE SERPL-MCNC: 99 MG/DL (ref 65–139)
POTASSIUM SERPL-SCNC: 3.8 MMOL/L (ref 3.5–5.3)
SODIUM SERPL-SCNC: 142 MMOL/L (ref 135–146)

## 2025-07-18 ENCOUNTER — SPECIALTY PHARMACY (OUTPATIENT)
Dept: PHARMACY | Facility: CLINIC | Age: 80
End: 2025-07-18

## 2025-07-18 DIAGNOSIS — M81.0 AGE RELATED OSTEOPOROSIS, UNSPECIFIED PATHOLOGICAL FRACTURE PRESENCE: ICD-10-CM

## 2025-07-18 RX ORDER — DENOSUMAB 60 MG/ML
INJECTION SUBCUTANEOUS
Qty: 1 ML | Refills: 0 | Status: CANCELLED | OUTPATIENT
Start: 2025-07-18 | End: 2026-07-18

## 2025-07-21 ENCOUNTER — SPECIALTY PHARMACY (OUTPATIENT)
Dept: PHARMACY | Facility: CLINIC | Age: 80
End: 2025-07-21

## 2025-07-21 DIAGNOSIS — M81.0 AGE RELATED OSTEOPOROSIS, UNSPECIFIED PATHOLOGICAL FRACTURE PRESENCE: ICD-10-CM

## 2025-07-21 RX ORDER — DENOSUMAB 60 MG/ML
INJECTION SUBCUTANEOUS
Qty: 1 ML | Refills: 0 | Status: SHIPPED | OUTPATIENT
Start: 2025-07-21 | End: 2026-07-21

## 2025-08-04 DIAGNOSIS — R91.8 MULTIPLE LUNG NODULES ON CT: ICD-10-CM

## 2025-08-05 PROCEDURE — RXMED WILLOW AMBULATORY MEDICATION CHARGE

## 2025-08-12 ENCOUNTER — SPECIALTY PHARMACY (OUTPATIENT)
Dept: PHARMACY | Facility: CLINIC | Age: 80
End: 2025-08-12

## 2025-08-13 ENCOUNTER — PHARMACY VISIT (OUTPATIENT)
Dept: PHARMACY | Facility: CLINIC | Age: 80
End: 2025-08-13
Payer: MEDICARE

## 2025-09-10 ENCOUNTER — APPOINTMENT (OUTPATIENT)
Dept: PRIMARY CARE | Facility: CLINIC | Age: 80
End: 2025-09-10
Payer: MEDICARE

## 2025-11-03 ENCOUNTER — APPOINTMENT (OUTPATIENT)
Dept: PULMONOLOGY | Facility: CLINIC | Age: 80
End: 2025-11-03
Payer: MEDICARE